# Patient Record
Sex: FEMALE | Race: BLACK OR AFRICAN AMERICAN | NOT HISPANIC OR LATINO | Employment: OTHER | URBAN - METROPOLITAN AREA
[De-identification: names, ages, dates, MRNs, and addresses within clinical notes are randomized per-mention and may not be internally consistent; named-entity substitution may affect disease eponyms.]

---

## 2022-08-17 ENCOUNTER — OFFICE VISIT (OUTPATIENT)
Dept: FAMILY MEDICINE | Facility: CLINIC | Age: 77
End: 2022-08-17
Payer: COMMERCIAL

## 2022-08-17 VITALS
TEMPERATURE: 97.7 F | HEART RATE: 67 BPM | RESPIRATION RATE: 15 BRPM | DIASTOLIC BLOOD PRESSURE: 70 MMHG | OXYGEN SATURATION: 98 % | BODY MASS INDEX: 25.62 KG/M2 | SYSTOLIC BLOOD PRESSURE: 110 MMHG | WEIGHT: 171 LBS

## 2022-08-17 DIAGNOSIS — M85.80 OSTEOPENIA, UNSPECIFIED LOCATION: ICD-10-CM

## 2022-08-17 DIAGNOSIS — E78.5 HYPERLIPIDEMIA LDL GOAL <100: ICD-10-CM

## 2022-08-17 DIAGNOSIS — Z00.00 ANNUAL PHYSICAL EXAM: Primary | ICD-10-CM

## 2022-08-17 RX ORDER — DORZOLAMIDE HCL 20 MG/ML
1 SOLUTION/ DROPS OPHTHALMIC
COMMUNITY
Start: 2022-01-05

## 2022-08-17 RX ORDER — HYDROCHLOROTHIAZIDE 25 MG/1
25 TABLET ORAL
COMMUNITY
Start: 2022-04-29 | End: 2024-04-05

## 2022-08-17 RX ORDER — LOSARTAN POTASSIUM 25 MG/1
1 TABLET ORAL DAILY
COMMUNITY
Start: 2021-01-05 | End: 2023-09-18

## 2022-08-17 RX ORDER — LETROZOLE 2.5 MG/1
1 TABLET, FILM COATED ORAL DAILY
COMMUNITY
Start: 2022-06-22 | End: 2024-07-24

## 2022-08-17 RX ORDER — ALBUTEROL SULFATE 90 UG/1
1-2 AEROSOL, METERED RESPIRATORY (INHALATION)
COMMUNITY
Start: 2022-06-22

## 2022-08-17 RX ORDER — LATANOPROST 50 UG/ML
SOLUTION/ DROPS OPHTHALMIC
COMMUNITY
Start: 2022-07-31

## 2022-08-17 RX ORDER — EVOLOCUMAB 140 MG/ML
140 INJECTION, SOLUTION SUBCUTANEOUS
COMMUNITY
Start: 2020-12-30

## 2022-08-17 NOTE — PATIENT INSTRUCTIONS
ASSESSMENT/PLAN:    Annual Exam/Preventive Issues   -Will check Fasting labs. Schedule visit  -  - Up to date on Colon cancer screenings  - Has had an Dexa scan and was slightly low. Needs a repeat. Ordered  - Suggested going to pharmacy for Shingles vaccines.   - Has eye care.     -Specific concerns:     1. Breast cancer is being followed by Oncology  2. Hypertension, under good control, yet with some elevated pressures in afternoon. But, some lightheadedness.   - Start Magnesium 325 mg at bedtime  - Also, eat a diet high in garlic and green vegetables    3. For balance, look into Jay Chi online, try [employer]  Let me know if questions.     4. For RIGHT ear cerumen, use Debrox     -Follow up: as needed or in 1 year    Gorge Odom MD, MS

## 2022-08-17 NOTE — NURSING NOTE
77 year old  Chief Complaint   Patient presents with     [facility], [place], live 1/2 year in [place],      Hypertension     BP been higher in the evenings (146/86), getting headaches       Blood pressure 110/70, pulse 67, temperature 97.7  F (36.5  C), temperature source Skin, resp. rate 15, weight 77.6 kg (171 lb), SpO2 98 %. Body mass index is 25.62 kg/m .  There is no problem list on file for this patient.      Wt Readings from Last 2 Encounters:   08/17/22 77.6 kg (171 lb)   01/06/16 70.3 kg (155 lb)     BP Readings from Last 3 Encounters:   08/17/22 110/70   01/06/16 122/80   12/30/15 162/80         Current Outpatient Medications   Medication     albuterol (PROAIR HFA/PROVENTIL HFA/VENTOLIN HFA) 108 (90 Base) MCG/ACT inhaler     ASPIRIN ADULT LOW STRENGTH PO     dorzolamide (TRUSOPT) 2 % ophthalmic solution     evolocumab (REPATHA SURECLICK) 140 MG/ML prefilled autoinjector     hydrochlorothiazide (HYDRODIURIL) 25 MG tablet     latanoprost (XALATAN) 0.005 % ophthalmic solution     letrozole (FEMARA) 2.5 MG tablet     losartan (COZAAR) 25 MG tablet     VITAMIN D, CHOLECALCIFEROL, PO     ANASTROZOLE PO     ATORVASTATIN CALCIUM PO     LISINOPRIL PO     No current facility-administered medications for this visit.       Social History     Tobacco Use     Smoking status: Never Smoker     Smokeless tobacco: Never Used   Substance Use Topics     Alcohol use: Yes     Drug use: No       Health Maintenance Due   Topic Date Due     DEXA  Never done     ADVANCE CARE PLANNING  Never done     MAMMO SCREENING  Never done     COLORECTAL CANCER SCREENING  Never done     HEPATITIS C SCREENING  Never done     LIPID  Never done     FALL RISK ASSESSMENT  Never done     ZOSTER IMMUNIZATION (2 of 3) 09/24/2012     COVID-19 Vaccine (2 - Pfizer series) 02/03/2021       No results found for: PAP      August 17, 2022 10:56 AM

## 2022-08-17 NOTE — PROGRESS NOTES
Alessandra Nieves is a 77 year old female who presents today to the [facility] to establish care and have an annual exam.         ASSESSMENT/PLAN:    Annual Exam/Preventive Issues   -Will check Fasting labs. Schedule visit  -  - Up to date on Colon cancer screenings  - Has had an Dexa scan and was slightly low. Needs a repeat. Ordered  - Suggested going to pharmacy for Shingles vaccines.   - Has eye care.     -Specific concerns:     1. Breast cancer is being followed by Oncology  2. Hypertension, under good control, yet with some elevated pressures in afternoon. But, some lightheadedness.   - Start Magnesium 325 mg at bedtime  - Also, eat a diet high in garlic and green vegetables    3. For balance, look into Jay Chi online, try [employer]  Let me know if questions.     4. For RIGHT ear cerumen, use Debrox     -Follow up: as needed or in 1 year    Gorge Odom MD, MS    Introduction: Alessandra is the wife of Terry López who will see in clinic.  She is here making her first visit to our clinic.  She would like to establish care here.  Formally, she has had much of her care down in [facility] where they spend some of the winter.      No major concerns today.  Had breast cancer about 10 years ago. Had surgery and radiation. Now on Letrozole. Had tried other meds but developed aches.     Has high cholesterol. Was intolerant of Rosuvastatin. Now on Repatha and she has no problems with it.     Has hypertension. BP in evenings is about 140s/80s. She's worried about that. She tends to be much lower in the morning. Takes hydrochlorothiazide in morning and Losartan in the evenings.   She wonders whether changes are needed. Has been too low in past and has had syncopal episodes in the past. She's had evaluations from Cardiology    Has a strong family history of hypertension and hypercholesterolemia.   Spends half the year in [place]    Current Medications include:   Current Outpatient Medications   Medication Sig Dispense  Refill     albuterol (PROAIR HFA/PROVENTIL HFA/VENTOLIN HFA) 108 (90 Base) MCG/ACT inhaler Inhale 1-2 puffs into the lungs       ASPIRIN ADULT LOW STRENGTH PO Take 81 mg by mouth daily       dorzolamide (TRUSOPT) 2 % ophthalmic solution Apply 1 drop to eye       evolocumab (REPATHA SURECLICK) 140 MG/ML prefilled autoinjector Inject 140 mg Subcutaneous       hydrochlorothiazide (HYDRODIURIL) 25 MG tablet Take 25 mg by mouth       latanoprost (XALATAN) 0.005 % ophthalmic solution        letrozole (FEMARA) 2.5 MG tablet Take 1 tablet by mouth daily       losartan (COZAAR) 25 MG tablet Take 1 tablet by mouth daily       VITAMIN D, CHOLECALCIFEROL, PO Take 2,000 Units by mouth daily       Allergies   Allergen Reactions     Penicillins        Social  Social History     Social History Narrative     to Terry López. They have 7 children.     Originally from [place], [place].     Has lived in [place] for decades.     Was H.R. 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     Spends half the year in [place].      One of her daughters just won an International Architecture prize in [place]    Lifestyle habits and Preventive health issues:     Physical activity - Walks a lot, a few miles/day. Also, with E-biSolexant    Diet - Healthy    Cancer screenings:  Breast: - See above. Under care of breast cancer specialist.   Cervical: - Had many normal. Not needed  Colorectal: - Last colonoscopy was this year and normal.       Advance directive: Has at home  Hearing concerns: None.   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 On drops.   Independent at home:  Yes.    Safe : No concerns.     Has not had falls at home or trouble with balance.   Memory concerns: None.   Recall of events and words, intact.            ROS  PHQ-2 Score:     PHQ-2 ( 1999 Pfizer) 8/17/2022   Q1: Little interest or pleasure in doing things 0   Q2: Feeling down, depressed or hopeless 0   PHQ-2 Score 0       CONSTITUTIONAL:NEGATIVE for fever, chills, change in  weight  INTEGUMENTARY/SKIN: NEGATIVE for worrisome rashes, moles or lesions  EYES: NEGATIVE for vision changes or irritation  ENT/MOUTH: NEGATIVE for ear, mouth and throat problems  RESP:NEGATIVE for significant cough or SOB  CV: NEGATIVE for chest pain, palpitations, ZACARIAS, orthopnea, PND  or peripheral edema  GI: NEGATIVE for nausea, abdominal pain, heartburn, or change in bowel habits  :NEGATIVE for frequency, dysuria, or hematuria  MUSCULOSKELETAL:NEGATIVE for significant arthralgias or myalgia  NEURO: NEGATIVE for weakness, dizziness or paresthesias  ENDOCRINE: NEGATIVE for polyuria/dipsia,  temperature intolerance, skin/hair changes  HEME/ALLERGY/IMMUNE: NEGATIVE for bleeding problems  PSYCHIATRIC: NEGATIVE for changes in mood or affect      Health Maintenance   Topic Date Due     DEXA  Never done     ADVANCE CARE PLANNING  Never done     MAMMO SCREENING  Never done     COLORECTAL CANCER SCREENING  Never done     HEPATITIS C SCREENING  Never done     LIPID  Never done     FALL RISK ASSESSMENT  Never done     ZOSTER IMMUNIZATION (2 of 3) 09/24/2012     COVID-19 Vaccine (2 - Pfizer series) 02/03/2021     INFLUENZA VACCINE (1) 09/01/2022     DTAP/TDAP/TD IMMUNIZATION (2 - Td or Tdap) 11/12/2023     PHQ-2 (once per calendar year)  Completed     Pneumococcal Vaccine: 65+ Years  Completed     IPV IMMUNIZATION  Aged Out     MENINGITIS IMMUNIZATION  Aged Out     HEPATITIS B IMMUNIZATION  Aged Out         There is no problem list on file for this patient.      Past Surgical History:   Procedure Laterality Date     BIOPSY      breast     BREAST SURGERY      right lumpectomy     COLONOSCOPY       PHACOEMULSIFICATION CLEAR CORNEA WITH DELUXE INTRAOCULAR LENS IMPLANT Left 12/30/2015    Procedure: PHACOEMULSIFICATION CLEAR CORNEA WITH DELUXE INTRAOCULAR LENS IMPLANT;  Surgeon: Wallace Tim MD;  Location[facility]     PHACOEMULSIFICATION CLEAR CORNEA WITH DELUXE INTRAOCULAR LENS IMPLANT Right 1/6/2016    Procedure:  PHACOEMULSIFICATION CLEAR CORNEA WITH DELUXE INTRAOCULAR LENS IMPLANT;  Surgeon: Wallace Tim MD;  Location: [facility]     refractive surgery       TONSILLECTOMY & ADENOIDECTOMY       TUBAL LIGATION         No family history on file.          Immunizations are as follows:      Immunization History   Administered Date(s) Administered     COVID-19,PF,Pfizer (12+ Yrs) 01/13/2021     Influenza (IIV3) PF 09/29/2020     Influenza Vaccine IM > 6 months Valent IIV4 (Alfuria,Fluzone) 11/19/2014, 12/08/2015, 12/12/2016     Pneumo Conj 13-V (2010&after) 11/19/2014     Pneumococcal 23 valent 08/31/2012     Td (Adult), Adsorbed 01/01/2001, 01/01/2004     Tdap (Adacel,Boostrix) 11/12/2013     Zoster vaccine, live 07/30/2012           EXAM  /70 (BP Location: Left arm, Patient Position: Sitting, Cuff Size: Adult Regular)   Pulse 67   Temp 97.7  F (36.5  C) (Skin)   Resp 15   Wt 77.6 kg (171 lb)   SpO2 98%   BMI 25.62 kg/m        GENERAL APPEARANCE: Appears well.  HEENT: RIGHT Tm with some cerumen. LEFT is normal. Neck is supple. No adenopathy, thyroid normal to palpation  RESP: Lungs clear to auscultation bilaterally.  Axillae: no palpable axillary masses or adenopathy  CV: regular rate and rhythm, normal S1 S2, no murmur, no carotid bruits  ABDOMEN: soft, nontender, without HSM or masses. Bowel sounds normal   and Rectal exam: deferred  MS: Extremities normal- no gross deformities noted, no tender, hot or swollen joints.    SKIN: no suspicious lesions or rashes  NEURO: Normal strength and tone, sensory exam grossly normal  PSYCH: mentation appears normal. and affect normal/bright.  EXT: no peripheral edema        SEE TOP OF NOTE FOR ASSESSMENT AND PLAN      --Gorge Odom MD  [facility]  Department of Family Medicine and Pending sale to [facility]

## 2022-08-19 ENCOUNTER — LAB (OUTPATIENT)
Dept: LAB | Facility: CLINIC | Age: 77
End: 2022-08-19
Payer: COMMERCIAL

## 2022-08-19 DIAGNOSIS — Z00.00 ANNUAL PHYSICAL EXAM: ICD-10-CM

## 2022-08-19 DIAGNOSIS — E78.5 HYPERLIPIDEMIA LDL GOAL <100: ICD-10-CM

## 2022-08-19 LAB
ANION GAP SERPL CALCULATED.3IONS-SCNC: 11 MMOL/L (ref 7–15)
BUN SERPL-MCNC: 10.9 MG/DL (ref 8–23)
CALCIUM SERPL-MCNC: 10.1 MG/DL (ref 8.8–10.2)
CHLORIDE SERPL-SCNC: 103 MMOL/L (ref 98–107)
CHOLEST SERPL-MCNC: 189 MG/DL
CREAT SERPL-MCNC: 0.87 MG/DL (ref 0.51–0.95)
DEPRECATED HCO3 PLAS-SCNC: 23 MMOL/L (ref 22–29)
GFR SERPL CREATININE-BSD FRML MDRD: 69 ML/MIN/1.73M2
GLUCOSE SERPL-MCNC: 87 MG/DL (ref 70–99)
HDLC SERPL-MCNC: 78 MG/DL
LDLC SERPL CALC-MCNC: 98 MG/DL
NONHDLC SERPL-MCNC: 111 MG/DL
POTASSIUM SERPL-SCNC: 3.9 MMOL/L (ref 3.4–5.3)
SODIUM SERPL-SCNC: 137 MMOL/L (ref 136–145)
TRIGL SERPL-MCNC: 67 MG/DL

## 2022-08-19 PROCEDURE — 80048 BASIC METABOLIC PNL TOTAL CA: CPT | Performed by: FAMILY MEDICINE

## 2022-08-19 PROCEDURE — 80061 LIPID PANEL: CPT | Performed by: FAMILY MEDICINE

## 2022-11-21 ENCOUNTER — OFFICE VISIT (OUTPATIENT)
Dept: FAMILY MEDICINE | Facility: CLINIC | Age: 77
End: 2022-11-21
Payer: COMMERCIAL

## 2022-11-21 ENCOUNTER — TELEPHONE (OUTPATIENT)
Dept: FAMILY MEDICINE | Facility: CLINIC | Age: 77
End: 2022-11-21

## 2022-11-21 VITALS
BODY MASS INDEX: 25.62 KG/M2 | DIASTOLIC BLOOD PRESSURE: 75 MMHG | RESPIRATION RATE: 14 BRPM | OXYGEN SATURATION: 98 % | TEMPERATURE: 97.1 F | SYSTOLIC BLOOD PRESSURE: 116 MMHG | WEIGHT: 173 LBS | HEIGHT: 69 IN | HEART RATE: 74 BPM

## 2022-11-21 DIAGNOSIS — R12 HEARTBURN: ICD-10-CM

## 2022-11-21 DIAGNOSIS — K21.9 GASTROESOPHAGEAL REFLUX DISEASE, UNSPECIFIED WHETHER ESOPHAGITIS PRESENT: ICD-10-CM

## 2022-11-21 DIAGNOSIS — K21.9 GASTROESOPHAGEAL REFLUX DISEASE, UNSPECIFIED WHETHER ESOPHAGITIS PRESENT: Primary | ICD-10-CM

## 2022-11-21 DIAGNOSIS — M25.512 ACUTE PAIN OF LEFT SHOULDER: ICD-10-CM

## 2022-11-21 DIAGNOSIS — R94.31 ABNORMAL ELECTROCARDIOGRAM: ICD-10-CM

## 2022-11-21 DIAGNOSIS — R22.1 THROAT SWELLING: ICD-10-CM

## 2022-11-21 NOTE — NURSING NOTE
"77 year old  Chief Complaint   Patient presents with     Throat Problem     Patient is wheezing, experiencing lump in throat, painful. Ongoing 2 weeks, coming and going.        Blood pressure 116/75, pulse 74, temperature 97.1  F (36.2  C), temperature source Skin, resp. rate 14, height 1.755 m (5' 9.09\"), weight 78.5 kg (173 lb), SpO2 98 %. Body mass index is 25.48 kg/m .  There is no problem list on file for this patient.      Wt Readings from Last 2 Encounters:   11/21/22 78.5 kg (173 lb)   08/17/22 77.6 kg (171 lb)     BP Readings from Last 3 Encounters:   11/21/22 116/75   08/17/22 110/70   01/06/16 122/80         Current Outpatient Medications   Medication     ASPIRIN ADULT LOW STRENGTH PO     dorzolamide (TRUSOPT) 2 % ophthalmic solution     evolocumab (REPATHA SURECLICK) 140 MG/ML prefilled autoinjector     hydrochlorothiazide (HYDRODIURIL) 25 MG tablet     latanoprost (XALATAN) 0.005 % ophthalmic solution     letrozole (FEMARA) 2.5 MG tablet     losartan (COZAAR) 25 MG tablet     VITAMIN D, CHOLECALCIFEROL, PO     albuterol (PROAIR HFA/PROVENTIL HFA/VENTOLIN HFA) 108 (90 Base) MCG/ACT inhaler     No current facility-administered medications for this visit.       Social History     Tobacco Use     Smoking status: Never     Smokeless tobacco: Never   Substance Use Topics     Alcohol use: Yes     Drug use: No       Health Maintenance Due   Topic Date Due     DEXA  Never done     ADVANCE CARE PLANNING  Never done     MAMMO SCREENING  Never done     COLORECTAL CANCER SCREENING  Never done     HEPATITIS C SCREENING  Never done     FALL RISK ASSESSMENT  Never done     ZOSTER IMMUNIZATION (2 of 3) 09/24/2012       No results found for: PAP      November 21, 2022 9:37 AM    "

## 2022-11-21 NOTE — PATIENT INSTRUCTIONS
"ASSESSMENT/PLAN:    Joaquín is a 76 yo with symptoms of fullness in her throat, what feels like \"indigestion\" and also with LEFT shoulder aching.   The LEFT shoulder is most likely rotator cuff degeration  Will treat with Prilosec  Also, obtain Ultrasound of neck   Refer to ENT. May cancel referral is symptoms clear  Obtain ECG in clinic. Of note, had normal stress Echo about 7 months ago    Note: EKG revealed NRS at 65 beats/minute, but with negative T waves suggestive of some possible anterior ischemia. Importantly, Alessandra informs me that she has had negative T waves in the past. I can't find those records.   - Will refer to Cardiology for further evaluation,     Follow-up - Next week by video or in person visit.     --Gorge Odom MD  "

## 2022-11-21 NOTE — PROGRESS NOTES
"Medical assistant intake:  Alessandra Nieves is a 77 year old female who presents to [facility] today for Throat Problem (Patient is wheezing, experiencing lump in throat, painful. Ongoing 2 weeks, coming and going. )        ASSESSMENT/PLAN:    Joaquín is a 78 yo with symptoms of fullness in her throat, what feels like \"indigestion\" and also with LEFT shoulder aching.   The LEFT shoulder is most likely rotator cuff degeration  1. Will treat with Prilosec  2. Also, obtain Ultrasound of neck   3. Refer to ENT. May cancel referral is symptoms clear  4. Obtain ECG in clinic. Of note, had normal stress Echo about 7 months ago    Note: EKG revealed NRS at 65 beats/minute, but with negative T waves suggestive of some possible anterior ischemia. Importantly, Alessandra informs me that she has had negative T waves in the past. I can't find those records.     - Will refer to Cardiology for further evaluation,     Also with left shoulder pain that appears consistent with rotator cuff tendinopathy.  Will reevaluate if symptoms persist.    Follow-up - Next week by video or in person visit.     --Gorge Odom MD      SUBJECTIVE:   Joaquín developed a lump in her throat and a feeling of indigestion about 2 weeks ago. The lump in the throat has come and gone. Currently not bothering her but still feels a fullness in the throat. No pain with swallowing.  Has been wheezing some in the cold. No shortness of breath with walking.     Review Of Systems:  Has had a new aching in her LEFT arm. The ache does not seem to her to be related temporally to the lump in the throat.   No fevers or sweats or chills.   Normal bowels and bladder.     Has otherwise been in usual state of health, e.g.   Cardiovascular: negative  Respiratory: No shortness of breath, dyspnea on exertion, cough, or hemoptysis  Genitourinary: negative    Problem list per EMR:  There is no problem list on file for this patient.      Current Outpatient Medications " "  Medication Sig Dispense Refill     ASPIRIN ADULT LOW STRENGTH PO Take 81 mg by mouth daily       dorzolamide (TRUSOPT) 2 % ophthalmic solution Apply 1 drop to eye       evolocumab (REPATHA SURECLICK) 140 MG/ML prefilled autoinjector Inject 140 mg Subcutaneous       hydrochlorothiazide (HYDRODIURIL) 25 MG tablet Take 25 mg by mouth       latanoprost (XALATAN) 0.005 % ophthalmic solution        letrozole (FEMARA) 2.5 MG tablet Take 1 tablet by mouth daily       losartan (COZAAR) 25 MG tablet Take 1 tablet by mouth daily       VITAMIN D, CHOLECALCIFEROL, PO Take 2,000 Units by mouth daily       albuterol (PROAIR HFA/PROVENTIL HFA/VENTOLIN HFA) 108 (90 Base) MCG/ACT inhaler Inhale 1-2 puffs into the lungs (Patient not taking: Reported on 11/21/2022)         Allergies   Allergen Reactions     Penicillins Shortness Of Breath        Social:   Unchanged.  Plans to be moving to [place] for the wintertime in a few weeks from now.      OBJECTIVE    Vitals: /75 (BP Location: Left arm, Patient Position: Sitting, Cuff Size: Adult Regular)   Pulse 74   Temp 97.1  F (36.2  C) (Skin)   Resp 14   Ht 1.755 m (5' 9.09\")   Wt 78.5 kg (173 lb)   SpO2 98%   BMI 25.48 kg/m    BMI= Body mass index is 25.48 kg/m .  She appears in her usual state of good health.  The neck is supple and without any thyromegaly or nodules noted.  Oropharynx is clear.    Cardiovascular-regular rate and rhythm without murmur    Lungs-clear to auscultation throughout    Extremities-no edema in the lower extremities    Left shoulder is with some aching in the anterior glenohumeral joint.  Her range of motion is intact with abduction to approximately 170 degrees internal rotation to approximately T10 and external rotation to approximately 85 degrees.  Strength is slightly limited on the left compared to the right even though she is left-handed.  It is at about 4/5 with the supraspinatus testing on the left shoulder.  Infraspinatus testing at 5-/5.  " Subscapularis appears intact.    EKG with normal sinus rhythm at 75 bpm with inverted T waves throughout the anterior precordial leads.     SEE TOP OF NOTE FOR ASSESSMENT AND PLAN  38 minutes spent on the date of the encounter doing chart review, history and exam, documentation and further activities as noted in the note.     --Gorge Odom MD  [facility], Department of Family Medicine and Community Health

## 2022-11-21 NOTE — TELEPHONE ENCOUNTER
Resending omeprazole order to pt pharmacy as they claim it was not received.    Rasta Barajas - ZACKERY, RN  11/21/22 4:35 PM

## 2022-11-22 ENCOUNTER — OFFICE VISIT (OUTPATIENT)
Dept: CARDIOLOGY | Facility: CLINIC | Age: 77
End: 2022-11-22
Attending: INTERNAL MEDICINE
Payer: COMMERCIAL

## 2022-11-22 VITALS
HEART RATE: 70 BPM | DIASTOLIC BLOOD PRESSURE: 88 MMHG | HEIGHT: 68 IN | OXYGEN SATURATION: 99 % | WEIGHT: 171.4 LBS | SYSTOLIC BLOOD PRESSURE: 148 MMHG | BODY MASS INDEX: 25.98 KG/M2

## 2022-11-22 DIAGNOSIS — R07.89 ATYPICAL CHEST PAIN: Primary | ICD-10-CM

## 2022-11-22 DIAGNOSIS — R94.31 ABNORMAL ELECTROCARDIOGRAM: ICD-10-CM

## 2022-11-22 PROCEDURE — 99205 OFFICE O/P NEW HI 60 MIN: CPT | Performed by: INTERNAL MEDICINE

## 2022-11-22 PROCEDURE — G0463 HOSPITAL OUTPT CLINIC VISIT: HCPCS

## 2022-11-22 ASSESSMENT — PAIN SCALES - GENERAL: PAINLEVEL: NO PAIN (0)

## 2022-11-22 NOTE — LETTER
11/22/2022      RE: Alessandra Nieves  [id number]  [facility] [address]       Dear Colleague,    Thank you for the opportunity to participate in the care of your patient, Alessandra Nieves, at the [facility] [place] at [facility]. Please see a copy of my visit note below.    Chief complaint: abnormal ECG    HPI:   Alessandra Nieves is a 77 year old female with history of breast cancer, PE after lumpectomy, HTN, HL on Repatha, statin intolerance who presents for evaluation of chest pain and abnormal ECG.    She reports sensation of fullness and discomfort in her bilateral upper chest/neck for the past 2 weeks. She also has globus sensation. She had an episode last PM which continued all night (hours) until she went to sleep. She woke up with mild residual symptoms. She has never had symptoms like this in the past.    She had several episodes of near-syncope in the remote past but no syncope and has had evaluation for these which was unrevealing.    She has had a gradual decrease in exertional capacity. She did previously walk 3+ miles/day prior to COVID. She was riding her bike this summer without difficulty. She walks every 2-3 days for a mile and does have to stop due to dyspnea. No chest pain with activity.    She saw her PCP, Dr. Odom, who prescribed PPI and ordered ECG which was abnormal prompting referral.    Family history is notable for mother with HF and DM. Multiple family members with DM and CVA, no other cardiac history.    She denies any chest pain, dyspnea at rest or with exertion, PND, orthopnea, peripheral edema, palpitations, lightheadedness or syncope.       PAST MEDICAL HISTORY:  Past Medical History:   Diagnosis Date     Anxiety     with depression     Anxiety with obsessional features      Cervical spondylosis      Hx pulmonary embolism      Hyperlipidemia      Hypertension      Infiltrating ductal carcinoma of  "bilateral female breasts (H)      Irregular heart beat      Left arm weakness      Neck pain      Neutropenia (H)      Osteopenia      Pulmonary emboli (H)      Shortness of breath      Syncope      Thyroid nodule        CURRENT MEDICATIONS:  Current Outpatient Medications   Medication Sig Dispense Refill     albuterol (PROAIR HFA/PROVENTIL HFA/VENTOLIN HFA) 108 (90 Base) MCG/ACT inhaler Inhale 1-2 puffs into the lungs       ASPIRIN ADULT LOW STRENGTH PO Take 81 mg by mouth daily       dorzolamide (TRUSOPT) 2 % ophthalmic solution Apply 1 drop to eye       evolocumab (REPATHA SURECLICK) 140 MG/ML prefilled autoinjector Inject 140 mg Subcutaneous       hydrochlorothiazide (HYDRODIURIL) 25 MG tablet Take 25 mg by mouth       latanoprost (XALATAN) 0.005 % ophthalmic solution        letrozole (FEMARA) 2.5 MG tablet Take 1 tablet by mouth daily       losartan (COZAAR) 25 MG tablet Take 1 tablet by mouth daily       omeprazole (PRILOSEC) 20 MG DR capsule Use 1 cap by mouth daily. 30 capsule 0     VITAMIN D, CHOLECALCIFEROL, PO Take 2,000 Units by mouth daily         ALLERGIES:     Allergies   Allergen Reactions     Penicillins Shortness Of Breath       FAMILY HISTORY:  Family history is notable for mother with HF and DM. Multiple family members with DM and CVA, no other cardiac history.  No family history of premature CAD or sudden death.      SOCIAL HISTORY:  Social History     Tobacco Use     Smoking status: Never     Smokeless tobacco: Never   Substance Use Topics     Alcohol use: Yes     Drug use: No       ROS:   A comprehensive 14 point review of systems is negative other than as mentioned in HPI.    Exam:  BP (!) 148/88 (BP Location: Left arm, Patient Position: Sitting, Cuff Size: Adult Regular)   Pulse 70   Ht 1.723 m (5' 7.84\")   Wt 77.7 kg (171 lb 6.4 oz)   SpO2 99%   BMI 26.19 kg/m    GENERAL APPEARANCE: healthy, alert and no distress  EYES: no icterus, no xanthelasmas  ENT: normal palate, mucosa moist, no " central cyanosis  NECK: JVP not elevated  RESPIRATORY: lungs clear to auscultation - no rales, rhonchi or wheezes, no use of accessory muscles, no retractions, respirations are unlabored, normal respiratory rate  CARDIOVASCULAR: regular rhythm, normal S1 with physiologic split S2, no S3 or S4 and no murmur, click or rub.  GI: soft, non tender, bowel sounds normal,no abdominal bruits  EXTREMITIES: no edema, no bruits  NEURO: alert and oriented to person/place/time, normal speech, gait and affect  VASC: Radial, dorsalis pedis and posterior tibialis pulses 2+ bilaterally.  SKIN: no ecchymoses, no rashes.  PSYCH: cooperative, affect appropriate.     Labs:  Reviewed.       Testing/Procedures:  I personally visualized and interpreted:  ECG 11/21/22: sinus rhythm, VR 65,  QRS 90 QTc 418. Anterolateral ST depressions and TWI.     Outside results of note:  Outside records from [facility] ([place]), [facility] were obtained, and relevant results/notes have been incorporated into HPI.    ECG ([place], [place]): Normal sinus rhythm   Possible Left atrial enlargement   Septal infarct , age undetermined   T wave abnormality, consider anterior ischemia    Stress echcoardiogram 4/20/22 ([place], [place]):  1. The left ventricular systolic function is normal with an LVEF of 62 %. The LVEF was estimated   using the biplane method of discs (Paredes's).   2. Normal diastolic function and LV end-diastolic pressure.   3. Trace-to-mild mitral regurgitation.   4. Mild tricuspid regurgitation (1+).   5. Her level of exercise represents a good exercise tolerance for age.   6. Normal heart rate response to stress. The blood pressure response to stress was hypertensive.   7. There is no echocardiographic evidence of ischemia.        TTE 2020 [facility]  1. Global systolic function: The left ventricular systolic function is normal with an LVEF of 57 %.   (+) Concentric LV remodeling.   2. Diastolic function: The LV end-diastolic  pressure is normal.   3. Suboptimal tricuspid regurgitant Doppler wave profile precludes estimation of the RVSP.   4. There is no echocardiographic evidence of valvular heart disease.       Assessment and Plan:   1. Atypical chest pain  2. Abnormal ECG (ST depressions/T wave inversions)  Given baseline abnormal ECG with apparently new ischemic changes on 11/21/22 and negative stress test 4/20/22 with onset of symptoms in the past year and significant risk factors (HTN, HL), I do believe that further workup is warranted and that the most appropriate evaluation is with an anatomic modality. Plan for coronary CTA. Will also obtain TTE to exclude structural abnormality (LVH, undiagnosed apical HCM, etc.) as etiology for symptoms  -coronary CTA next available  -TTE next available  -follow up with EMILIE after the above    The patient states understanding and is agreeable with plan.   Chart review time today: 27 minutes  Visit time today: 39 minutes  Total time spent today: 66 minutes    Gurmeet Martinez MD  Cardiology    CC  [facility] PALOMO

## 2022-11-22 NOTE — PROGRESS NOTES
Chief complaint: abnormal ECG    HPI:   Alessandra Nieves is a 77 year old female with history of breast cancer, PE after lumpectomy, HTN, HL on Repatha, statin intolerance who presents for evaluation of chest pain and abnormal ECG.    She reports sensation of fullness and discomfort in her bilateral upper chest/neck for the past 2 weeks. She also has globus sensation. She had an episode last PM which continued all night (hours) until she went to sleep. She woke up with mild residual symptoms. She has never had symptoms like this in the past.    She had several episodes of near-syncope in the remote past but no syncope and has had evaluation for these which was unrevealing.    She has had a gradual decrease in exertional capacity. She did previously walk 3+ miles/day prior to COVID. She was riding her bike this summer without difficulty. She walks every 2-3 days for a mile and does have to stop due to dyspnea. No chest pain with activity.    She saw her PCP, Dr. Odom, who prescribed PPI and ordered ECG which was abnormal prompting referral.    Family history is notable for mother with HF and DM. Multiple family members with DM and CVA, no other cardiac history.    She denies any chest pain, dyspnea at rest or with exertion, PND, orthopnea, peripheral edema, palpitations, lightheadedness or syncope.       PAST MEDICAL HISTORY:  Past Medical History:   Diagnosis Date     Anxiety     with depression     Anxiety with obsessional features      Cervical spondylosis      Hx pulmonary embolism      Hyperlipidemia      Hypertension      Infiltrating ductal carcinoma of bilateral female breasts (H)      Irregular heart beat      Left arm weakness      Neck pain      Neutropenia (H)      Osteopenia      Pulmonary emboli (H)      Shortness of breath      Syncope      Thyroid nodule        CURRENT MEDICATIONS:  Current Outpatient Medications   Medication Sig Dispense Refill     albuterol (PROAIR HFA/PROVENTIL HFA/VENTOLIN HFA)  "108 (90 Base) MCG/ACT inhaler Inhale 1-2 puffs into the lungs       ASPIRIN ADULT LOW STRENGTH PO Take 81 mg by mouth daily       dorzolamide (TRUSOPT) 2 % ophthalmic solution Apply 1 drop to eye       evolocumab (REPATHA SURECLICK) 140 MG/ML prefilled autoinjector Inject 140 mg Subcutaneous       hydrochlorothiazide (HYDRODIURIL) 25 MG tablet Take 25 mg by mouth       latanoprost (XALATAN) 0.005 % ophthalmic solution        letrozole (FEMARA) 2.5 MG tablet Take 1 tablet by mouth daily       losartan (COZAAR) 25 MG tablet Take 1 tablet by mouth daily       omeprazole (PRILOSEC) 20 MG DR capsule Use 1 cap by mouth daily. 30 capsule 0     VITAMIN D, CHOLECALCIFEROL, PO Take 2,000 Units by mouth daily         ALLERGIES:     Allergies   Allergen Reactions     Penicillins Shortness Of Breath       FAMILY HISTORY:  Family history is notable for mother with HF and DM. Multiple family members with DM and CVA, no other cardiac history.  No family history of premature CAD or sudden death.      SOCIAL HISTORY:  Social History     Tobacco Use     Smoking status: Never     Smokeless tobacco: Never   Substance Use Topics     Alcohol use: Yes     Drug use: No       ROS:   A comprehensive 14 point review of systems is negative other than as mentioned in HPI.    Exam:  BP (!) 148/88 (BP Location: Left arm, Patient Position: Sitting, Cuff Size: Adult Regular)   Pulse 70   Ht 1.723 m (5' 7.84\")   Wt 77.7 kg (171 lb 6.4 oz)   SpO2 99%   BMI 26.19 kg/m    GENERAL APPEARANCE: healthy, alert and no distress  EYES: no icterus, no xanthelasmas  ENT: normal palate, mucosa moist, no central cyanosis  NECK: JVP not elevated  RESPIRATORY: lungs clear to auscultation - no rales, rhonchi or wheezes, no use of accessory muscles, no retractions, respirations are unlabored, normal respiratory rate  CARDIOVASCULAR: regular rhythm, normal S1 with physiologic split S2, no S3 or S4 and no murmur, click or rub.  GI: soft, non tender, bowel sounds " normal,no abdominal bruits  EXTREMITIES: no edema, no bruits  NEURO: alert and oriented to person/place/time, normal speech, gait and affect  VASC: Radial, dorsalis pedis and posterior tibialis pulses 2+ bilaterally.  SKIN: no ecchymoses, no rashes.  PSYCH: cooperative, affect appropriate.     Labs:  Reviewed.       Testing/Procedures:  I personally visualized and interpreted:  ECG 11/21/22: sinus rhythm, VR 65,  QRS 90 QTc 418. Anterolateral ST depressions and TWI.     Outside results of note:  Outside records from [facility] ([place]), [facility] were obtained, and relevant results/notes have been incorporated into HPI.    ECG ([place], [place]): Normal sinus rhythm   Possible Left atrial enlargement   Septal infarct , age undetermined   T wave abnormality, consider anterior ischemia    Stress echcoardiogram 4/20/22 ([place], [place]):  1. The left ventricular systolic function is normal with an LVEF of 62 %. The LVEF was estimated   using the biplane method of discs (Paredes's).   2. Normal diastolic function and LV end-diastolic pressure.   3. Trace-to-mild mitral regurgitation.   4. Mild tricuspid regurgitation (1+).   5. Her level of exercise represents a good exercise tolerance for age.   6. Normal heart rate response to stress. The blood pressure response to stress was hypertensive.   7. There is no echocardiographic evidence of ischemia.        TTE 2020 [facility]  1. Global systolic function: The left ventricular systolic function is normal with an LVEF of 57 %.   (+) Concentric LV remodeling.   2. Diastolic function: The LV end-diastolic pressure is normal.   3. Suboptimal tricuspid regurgitant Doppler wave profile precludes estimation of the RVSP.   4. There is no echocardiographic evidence of valvular heart disease.       Assessment and Plan:   1. Atypical chest pain  2. Abnormal ECG (ST depressions/T wave inversions)  Given baseline abnormal ECG with apparently new ischemic changes on 11/21/22  and negative stress test 4/20/22 with onset of symptoms in the past year and significant risk factors (HTN, HL), I do believe that further workup is warranted and that the most appropriate evaluation is with an anatomic modality. Plan for coronary CTA. Will also obtain TTE to exclude structural abnormality (LVH, undiagnosed apical HCM, etc.) as etiology for symptoms  -coronary CTA next available  -TTE next available  -follow up with EMILIE after the above    The patient states understanding and is agreeable with plan.   Chart review time today: 27 minutes  Visit time today: 39 minutes  Total time spent today: 66 minutes    Gurmeet Martinez MD  Cardiology    CC  [facility]

## 2022-11-22 NOTE — NURSING NOTE
Chief Complaint   Patient presents with     New Patient     New cardiology, 76 yo female with abnormal EKG (done 11/21) presents for initial visit.          Vitals were taken and medications reconciled.     Gustabo Virgen, EMT   3:26 PM

## 2022-11-22 NOTE — PATIENT INSTRUCTIONS
"Cardiology Providers you saw during your visit:  Dr. Martinez    Medication changes:  No changes    Follow up:  Need to schedule a CTA and echo, follow up will be based on results    Please call if you have :  1. Weight gain of more than 2 pounds in a day or 5 pounds in a week  2. Increased shortness of breath, swelling or bloating  3. Dizziness, lightheadedness   4. Any questions or concerns.     Follow the American Heart Association Diet and Lifestyle recommendations:  Limit saturated fat, trans fat, sodium, red meat, sweets and sugar-sweetened beverages. If you choose to eat red meat, compare labels and select the leanest cuts available.  Aim for at least 150 minutes of moderate physical activity or 75 minutes of vigorous physical activity - or an equal combination of both - each week.    During business hours: [phone], option # 1 \"To leave a message for your care team\"     After hours, weekends or holidays: On Call Cardiologist [phone]   option #4 and ask to speak to the on-call Cardiologist. Inform them you are a CORE/heart failure patient at the [facility].    Kathryn Robles RN BSN  Cardiology Nurse Coordinator - Heart Failure Clinic  [facility]  [phone] option 1 to schedule an appointment or leave a message for your care team    "

## 2022-11-23 ENCOUNTER — HOSPITAL ENCOUNTER (OUTPATIENT)
Dept: CARDIOLOGY | Facility: CLINIC | Age: 77
Discharge: HOME | End: 2022-11-23
Attending: INTERNAL MEDICINE
Payer: COMMERCIAL

## 2022-11-23 DIAGNOSIS — R94.31 ABNORMAL ELECTROCARDIOGRAM: ICD-10-CM

## 2022-11-25 ENCOUNTER — ANCILLARY PROCEDURE (OUTPATIENT)
Dept: ULTRASOUND IMAGING | Facility: CLINIC | Age: 77
End: 2022-11-25
Attending: FAMILY MEDICINE
Payer: COMMERCIAL

## 2022-11-25 DIAGNOSIS — R22.1 THROAT SWELLING: ICD-10-CM

## 2022-11-25 PROCEDURE — 76536 US EXAM OF HEAD AND NECK: CPT | Mod: GC | Performed by: RADIOLOGY

## 2022-11-30 ENCOUNTER — OFFICE VISIT (OUTPATIENT)
Dept: FAMILY MEDICINE | Facility: CLINIC | Age: 77
End: 2022-11-30
Payer: COMMERCIAL

## 2022-11-30 VITALS
BODY MASS INDEX: 24.78 KG/M2 | DIASTOLIC BLOOD PRESSURE: 83 MMHG | OXYGEN SATURATION: 100 % | WEIGHT: 173.08 LBS | SYSTOLIC BLOOD PRESSURE: 145 MMHG | RESPIRATION RATE: 14 BRPM | HEIGHT: 70 IN | HEART RATE: 61 BPM | TEMPERATURE: 97 F

## 2022-11-30 DIAGNOSIS — K21.9 GASTROESOPHAGEAL REFLUX DISEASE, UNSPECIFIED WHETHER ESOPHAGITIS PRESENT: Primary | ICD-10-CM

## 2022-11-30 NOTE — PROGRESS NOTES
Medical assistant intake:  Alessandra Nieves is a 77 year old female who presents to [facility] today for RECHECK (Follow-up from previous visit. Wheezing still present. Prilosec is helping with lump in throat sensation. )        ASSESSMENT/PLAN:    Alessandra is a 76 yo with indigestion and an abnormal ECG and indigestion  1. Indigestion on Prilosec  - Keep up with the Prilosec for at least the next 6 weeks.     2. Abnormal ECG. Under the care of Cardiology.   -Has visit for CTA Angiogram  - If concerns, call [phone]    Follow-up : As needed when back from [place]    --Gorge Odom MD      SUBJECTIVE:   Alessandra is here for follow up of her visit with me on 11/21. At that time, had fullness in her throat and what felt like indigestion. Had an ECG with some question of anterior ischemia.     Given Prilosec, sent for Ultrasound of the neck, and also a referral to Cardiology as she had an abnormal ECG.     Since being seen by me, has had an Echocardiogram which was normal.  Also, had a visit with Cardiology, Dr. Martinez on 11/22.   Has a CTA angiogram scheduled for tomorrow.    The Ultrasound of her neck was normal.   Alessandra is feeling much better, but still with the sense of indigestion if she doesn't take the Prilosec.     Of note, home BP around 138/80.    Review Of Systems:    Has otherwise been in usual state of health    Problem list per EMR:  There is no problem list on file for this patient.      Current Outpatient Medications   Medication Sig Dispense Refill     albuterol (PROAIR HFA/PROVENTIL HFA/VENTOLIN HFA) 108 (90 Base) MCG/ACT inhaler Inhale 1-2 puffs into the lungs       ASPIRIN ADULT LOW STRENGTH PO Take 81 mg by mouth daily       dorzolamide (TRUSOPT) 2 % ophthalmic solution Apply 1 drop to eye       evolocumab (REPATHA SURECLICK) 140 MG/ML prefilled autoinjector Inject 140 mg Subcutaneous       hydrochlorothiazide (HYDRODIURIL) 25 MG tablet Take 25 mg by mouth       latanoprost (XALATAN)  "0.005 % ophthalmic solution        letrozole (FEMARA) 2.5 MG tablet Take 1 tablet by mouth daily       losartan (COZAAR) 25 MG tablet Take 1 tablet by mouth daily       omeprazole (PRILOSEC) 20 MG DR capsule Use 1 cap by mouth daily. 30 capsule 0     VITAMIN D, CHOLECALCIFEROL, PO Take 2,000 Units by mouth daily         Allergies   Allergen Reactions     Penicillins Shortness Of Breath          OBJECTIVE    Vitals: BP (!) 145/83 (BP Location: Left arm, Patient Position: Sitting, Cuff Size: Adult Regular)   Pulse 61   Temp 97  F (36.1  C)   Resp 14   Ht 1.77 m (5' 9.69\")   Wt 78.5 kg (173 lb 1.3 oz)   SpO2 100%   BMI 25.06 kg/m    BMI= Body mass index is 25.06 kg/m .     Appears well and in no distress.  CV: RRR. No murmurs  Lungs: CTA  Gait normal.     SEE TOP OF NOTE FOR ASSESSMENT AND PLAN    --Gorge Odom MD  [facility], Department of Family Medicine and Community Health  "

## 2022-11-30 NOTE — NURSING NOTE
"77 year old  Chief Complaint   Patient presents with     RECHECK     Follow-up from previous visit. Wheezing still present. Prilosec is helping with lump in throat sensation.        Blood pressure (!) 145/83, pulse 61, temperature 97  F (36.1  C), resp. rate 14, height 1.77 m (5' 9.69\"), weight 78.5 kg (173 lb 1.3 oz), SpO2 100 %. Body mass index is 25.06 kg/m .  There is no problem list on file for this patient.      Wt Readings from Last 2 Encounters:   11/30/22 78.5 kg (173 lb 1.3 oz)   11/22/22 77.7 kg (171 lb 6.4 oz)     BP Readings from Last 3 Encounters:   11/30/22 (!) 145/83   11/22/22 (!) 148/88   11/21/22 116/75         Current Outpatient Medications   Medication     albuterol (PROAIR HFA/PROVENTIL HFA/VENTOLIN HFA) 108 (90 Base) MCG/ACT inhaler     ASPIRIN ADULT LOW STRENGTH PO     dorzolamide (TRUSOPT) 2 % ophthalmic solution     evolocumab (REPATHA SURECLICK) 140 MG/ML prefilled autoinjector     hydrochlorothiazide (HYDRODIURIL) 25 MG tablet     latanoprost (XALATAN) 0.005 % ophthalmic solution     letrozole (FEMARA) 2.5 MG tablet     losartan (COZAAR) 25 MG tablet     omeprazole (PRILOSEC) 20 MG DR capsule     VITAMIN D, CHOLECALCIFEROL, PO     No current facility-administered medications for this visit.       Social History     Tobacco Use     Smoking status: Never     Smokeless tobacco: Never   Vaping Use     Vaping Use: Never used   Substance Use Topics     Alcohol use: Yes     Drug use: No       Health Maintenance Due   Topic Date Due     DEXA  Never done     ADVANCE CARE PLANNING  Never done     MAMMO SCREENING  Never done     COLORECTAL CANCER SCREENING  Never done     HEPATITIS C SCREENING  Never done     FALL RISK ASSESSMENT  Never done     ZOSTER IMMUNIZATION (2 of 3) 09/24/2012       No results found for: PAP      November 30, 2022 10:14 AM    "

## 2022-11-30 NOTE — PATIENT INSTRUCTIONS
ASSESSMENT/PLAN:    Alessandra is a 76 yo with indigestion and an abnormal ECG and indigestion  Indigestion on Prilosec  - Keep up with the Prilosec for at least the next 6 weeks.     2. Abnormal ECG. Under the care of Cardiology.   -Has visit for CTA Angiogram  - If concerns, call [phone]    Follow-up : As needed when back from [place]    --Gorge Odom MD

## 2022-12-01 ENCOUNTER — HOSPITAL ENCOUNTER (OUTPATIENT)
Dept: CARDIOLOGY | Facility: CLINIC | Age: 77
Discharge: HOME | End: 2022-12-01
Attending: INTERNAL MEDICINE
Payer: COMMERCIAL

## 2022-12-01 VITALS — HEART RATE: 67 BPM | SYSTOLIC BLOOD PRESSURE: 136 MMHG | DIASTOLIC BLOOD PRESSURE: 73 MMHG

## 2022-12-01 DIAGNOSIS — R94.31 ABNORMAL ELECTROCARDIOGRAM: ICD-10-CM

## 2022-12-01 DIAGNOSIS — R07.89 ATYPICAL CHEST PAIN: ICD-10-CM

## 2022-12-01 LAB
CREAT BLD-MCNC: 1 MG/DL (ref 0.5–1)
GFR SERPL CREATININE-BSD FRML MDRD: 58 ML/MIN/1.73M2

## 2022-12-01 PROCEDURE — 250N000013 HC RX MED GY IP 250 OP 250 PS 637: Performed by: INTERNAL MEDICINE

## 2022-12-01 PROCEDURE — 75574 CT ANGIO HRT W/3D IMAGE: CPT | Mod: 26 | Performed by: INTERNAL MEDICINE

## 2022-12-01 PROCEDURE — 82565 ASSAY OF CREATININE: CPT

## 2022-12-01 PROCEDURE — 250N000011 HC RX IP 250 OP 636: Performed by: INTERNAL MEDICINE

## 2022-12-01 PROCEDURE — 75574 CT ANGIO HRT W/3D IMAGE: CPT

## 2022-12-01 RX ORDER — IOPAMIDOL 755 MG/ML
110 INJECTION, SOLUTION INTRAVASCULAR ONCE
Status: COMPLETED | OUTPATIENT
Start: 2022-12-01 | End: 2022-12-01

## 2022-12-01 RX ORDER — ACYCLOVIR 200 MG/1
0-1 CAPSULE ORAL
Status: DISCONTINUED | OUTPATIENT
Start: 2022-12-01 | End: 2022-12-02 | Stop reason: HOSPADM

## 2022-12-01 RX ORDER — DIPHENHYDRAMINE HCL 25 MG
25 CAPSULE ORAL
Status: DISCONTINUED | OUTPATIENT
Start: 2022-12-01 | End: 2022-12-02 | Stop reason: HOSPADM

## 2022-12-01 RX ORDER — NITROGLYCERIN 0.4 MG/1
0.4 TABLET SUBLINGUAL
Status: DISCONTINUED | OUTPATIENT
Start: 2022-12-01 | End: 2022-12-02 | Stop reason: HOSPADM

## 2022-12-01 RX ORDER — METOPROLOL TARTRATE 1 MG/ML
5-15 INJECTION, SOLUTION INTRAVENOUS
Status: DISCONTINUED | OUTPATIENT
Start: 2022-12-01 | End: 2022-12-02 | Stop reason: HOSPADM

## 2022-12-01 RX ORDER — DILTIAZEM HCL 60 MG
120 TABLET ORAL
Status: DISCONTINUED | OUTPATIENT
Start: 2022-12-01 | End: 2022-12-02 | Stop reason: HOSPADM

## 2022-12-01 RX ORDER — METOPROLOL TARTRATE 25 MG/1
25-100 TABLET, FILM COATED ORAL
Status: COMPLETED | OUTPATIENT
Start: 2022-12-01 | End: 2022-12-01

## 2022-12-01 RX ORDER — METHYLPREDNISOLONE SODIUM SUCCINATE 125 MG/2ML
125 INJECTION, POWDER, LYOPHILIZED, FOR SOLUTION INTRAMUSCULAR; INTRAVENOUS
Status: DISCONTINUED | OUTPATIENT
Start: 2022-12-01 | End: 2022-12-02 | Stop reason: HOSPADM

## 2022-12-01 RX ORDER — DILTIAZEM HYDROCHLORIDE 5 MG/ML
10-15 INJECTION INTRAVENOUS
Status: DISCONTINUED | OUTPATIENT
Start: 2022-12-01 | End: 2022-12-02 | Stop reason: HOSPADM

## 2022-12-01 RX ORDER — DIPHENHYDRAMINE HYDROCHLORIDE 50 MG/ML
25-50 INJECTION INTRAMUSCULAR; INTRAVENOUS
Status: DISCONTINUED | OUTPATIENT
Start: 2022-12-01 | End: 2022-12-02 | Stop reason: HOSPADM

## 2022-12-01 RX ORDER — IVABRADINE 5 MG/1
5-15 TABLET, FILM COATED ORAL
Status: COMPLETED | OUTPATIENT
Start: 2022-12-01 | End: 2022-12-01

## 2022-12-01 RX ORDER — ONDANSETRON 2 MG/ML
4 INJECTION INTRAMUSCULAR; INTRAVENOUS
Status: DISCONTINUED | OUTPATIENT
Start: 2022-12-01 | End: 2022-12-02 | Stop reason: HOSPADM

## 2022-12-01 RX ADMIN — METOPROLOL TARTRATE 100 MG: 50 TABLET, FILM COATED ORAL at 13:30

## 2022-12-01 RX ADMIN — IOPAMIDOL 110 ML: 755 INJECTION, SOLUTION INTRAVENOUS at 15:08

## 2022-12-01 RX ADMIN — IVABRADINE 10 MG: 5 TABLET, FILM COATED ORAL at 13:30

## 2022-12-03 ENCOUNTER — HEALTH MAINTENANCE LETTER (OUTPATIENT)
Age: 77
End: 2022-12-03

## 2022-12-06 ENCOUNTER — DOCUMENTATION ONLY (OUTPATIENT)
Dept: CARDIOLOGY | Facility: CLINIC | Age: 77
End: 2022-12-06

## 2022-12-06 NOTE — PROGRESS NOTES
Contacted Alessandra to discuss the results of her recent cardiac studies, summarized below. Chest pain has stably resolved since she was started on a PPI by her PCP.    TTE 11/23/22: Normal LV/RV size/function/thickness. No significant valvular abnormalities.    Coronary CTA 12/1/22: Mild non-obstructive CAD (mild mLAD, otherwise minimal diffuse CAD.) Agatston score 304.    Radiology over-read on 12/1/22 coronary CTA: 2 cm pulmonary nodule. Optional follow up CT in 12 months was suggested for high-risk patients only.     A/P:  1. Minimal non-obstructive CAD (coronary CTA 12/1/22):   Discussed that non-obstructive CAD does target her for aggressive preventative therapy. Chest pain has resolved without recurrence with PPI and is likely gastrointestinal in etiology.  -continue ASA 81 mg daily  -continue PCSK9 inhibitor (intolerant of multiple statins per 4/29/22 note from [facility], [place])    2. RLL pulmonary nodule on coronary CTA 12/1/22, incidental:  -consider non-contrast CT chest in 12 months to reassess pulmonary nodules  -she is going to discuss this with her Oncologist who follows her for her previous breast cancer    3. T-wave inversions on ECG:   -no structural correlate for this on TTE.    I offered her either as-needed or annual follow up for her non-obstructive CAD; she opted for the former.     Plan for Cardiology follow up on as-needed basis.     Gurmeet Martinez MD  Cardiology

## 2022-12-06 NOTE — PROGRESS NOTES
Contacted Alessandra to discuss the results of her recent cardiac studies, summarized below. Chest pain has stably resolved since she was started on a PPI by her PCP.     TTE 11/23/22: Normal LV/RV size/function/thickness. No significant valvular abnormalities.     Coronary CTA 12/1/22: Mild non-obstructive CAD (mild mLAD, otherwise minimal diffuse CAD.) Agatston score 304.     Radiology over-read on 12/1/22 coronary CTA: 2 cm pulmonary nodule. Optional follow up CT in 12 months was suggested for high-risk patients only.      A/P:  1. Minimal non-obstructive CAD (coronary CTA 12/1/22):   Discussed that non-obstructive CAD does target her for aggressive preventative therapy. Chest pain has resolved without recurrence with PPI and is likely gastrointestinal in etiology.  -continue ASA 81 mg daily  -continue PCSK9 inhibitor (intolerant of multiple statins per 4/29/22 note from [facility], [place])     2. RLL pulmonary nodule on coronary CTA 12/1/22, incidental:  -consider non-contrast CT chest in 12 months to reassess pulmonary nodules  -she is going to discuss this with her Oncologist who follows her for her previous breast cancer     3. T-wave inversions on ECG:   -no structural correlate for this on TTE.     I offered her either as-needed or annual follow up for her non-obstructive CAD; she opted for the former.      Plan for Cardiology follow up on as-needed basis.      Gurmeet Martinez MD  Cardiology

## 2022-12-13 DIAGNOSIS — K21.9 GASTROESOPHAGEAL REFLUX DISEASE, UNSPECIFIED WHETHER ESOPHAGITIS PRESENT: ICD-10-CM

## 2022-12-14 NOTE — TELEPHONE ENCOUNTER
Last visit 11/30/22, no future visit  Prescription approved per [facility] Refill Protocol.  Sharon Valdivia RN  [facility]

## 2022-12-28 DIAGNOSIS — K21.9 GASTROESOPHAGEAL REFLUX DISEASE, UNSPECIFIED WHETHER ESOPHAGITIS PRESENT: ICD-10-CM

## 2023-05-22 ENCOUNTER — OFFICE VISIT (OUTPATIENT)
Dept: FAMILY MEDICINE | Facility: CLINIC | Age: 78
End: 2023-05-22
Payer: COMMERCIAL

## 2023-05-22 VITALS
BODY MASS INDEX: 24.9 KG/M2 | HEART RATE: 63 BPM | SYSTOLIC BLOOD PRESSURE: 129 MMHG | WEIGHT: 172 LBS | RESPIRATION RATE: 17 BRPM | DIASTOLIC BLOOD PRESSURE: 81 MMHG | OXYGEN SATURATION: 99 % | TEMPERATURE: 97.3 F

## 2023-05-22 DIAGNOSIS — R10.13 ABDOMINAL PAIN, EPIGASTRIC: Primary | ICD-10-CM

## 2023-05-22 NOTE — PROGRESS NOTES
Medical assistant intake:  Alessandra Nieves is a 78 year old female who presents to [facility] today for Gastrointestinal Problem (Feeling of soreness from stomach through intestines. Was ongoing for about 2 weeks prior to making appointment, started taking a probiotic and has helped sx. Issues with swallowing -- one instance trying to swallow water and it got caught, notes feelings of 'fullness' where felt like air was stuck in throat. Happened 3-4x since last visit.) and Hypertension (Just back from [place], was getting high readings with home BP machine --  took her to ER, numbers normalized, was dx with conjunctivitis. Thinks home monitor may be inaccurate. )        ASSESSMENT/PLAN:    1. Hypertension.      -no change in medicine  - Discussed low salt and high veggie diet. Minimize mouthwash use  - Return to clinic for a nurse only BP check with your home BP cuff.     2. Gastrointestinal issues, (1) GI upset- improving and (2) near choking    -the Choking episodes do not seem like an esophageal narrowing as only occurring rarely and with first few bites.   - If symptoms increase, let me know and we can refer to GI for an endoscopy and/or other evaluations.       Follow-up - As needed.     --Gorge Odom MD      SUBJECTIVE:   Joaquín is here for a few issues.   The simplest is her blood pressure. Was getting high readings, but then normal at [facility] and feels that it may be that her BP cuff is inaccurate.   Drinking only 1 cup of coffee/day.   No other caffeine.   Taking Losartan and hydrochlorothiazide   Trying to eat low salt down.   Diet is still healthy.     Also, has recently had gastrointestinal irritation.   Started probiotics and that has helped greatly. Still not 100%, but definitely better.     Has had some near choking episodes 3 times over the past 3 months. Not regular. And, always at the start of swallowing.     Review Of Systems:    See subjective.   Has otherwise been in usual state of  health, e.g.   Cardiovascular: negative  Respiratory: No shortness of breath, dyspnea on exertion, cough, or hemoptysis  Genitourinary: negative    Problem list per EMR:  There is no problem list on file for this patient.      Current Outpatient Medications   Medication Sig Dispense Refill     albuterol (PROAIR HFA/PROVENTIL HFA/VENTOLIN HFA) 108 (90 Base) MCG/ACT inhaler Inhale 1-2 puffs into the lungs       ASPIRIN ADULT LOW STRENGTH PO Take 81 mg by mouth daily       dorzolamide (TRUSOPT) 2 % ophthalmic solution Apply 1 drop to eye       evolocumab (REPATHA SURECLICK) 140 MG/ML prefilled autoinjector Inject 140 mg Subcutaneous       hydrochlorothiazide (HYDRODIURIL) 25 MG tablet Take 25 mg by mouth       latanoprost (XALATAN) 0.005 % ophthalmic solution        letrozole (FEMARA) 2.5 MG tablet Take 1 tablet by mouth daily       losartan (COZAAR) 25 MG tablet Take 1 tablet by mouth daily       omeprazole (PRILOSEC) 20 MG DR capsule TAKE 1 CAPSULE BY MOUTH EVERY DAY 90 capsule 1     VITAMIN D, CHOLECALCIFEROL, PO Take 2,000 Units by mouth daily         Allergies   Allergen Reactions     Penicillins Shortness Of Breath        Social:   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       OBJECTIVE    Vitals: /81 (BP Location: Left arm, Patient Position: Sitting, Cuff Size: Adult Regular)   Pulse 63   Temp 97.3  F (36.3  C) (Temporal)   Resp 17   Wt 78 kg (172 lb)   SpO2 99%   BMI 24.90 kg/m    BMI= Body mass index is 24.9 kg/m .  Alessandra appears in good health.    HEENT is unremarkable.  Her neck is supple and without lymphadenopathy.  Also, her oropharynx is clear.    Cardiovascular-regular rate and rhythm without murmur    Lungs-clear to auscultation throughout    Abdomen- no hepatosplenomegaly.  No guarding.  No rebound.  Normal bowel sounds.    Extremities-no edema    SEE TOP OF NOTE FOR ASSESSMENT AND PLAN    --Gorge Odom MD  [facility], Department of Family Medicine  and Community Health

## 2023-05-22 NOTE — PATIENT INSTRUCTIONS
ASSESSMENT/PLAN:    Hypertension.      -no change in medicine  - Discussed low salt and high veggie diet. Minimize mouthwash use  - Return to clinic for a nurse only BP check with your home BP cuff.     2. Gastrointestinal issues, (1) GI upset- improving and (2) near choking    -the Choking episodes do not seem like an esophageal narrowing as only occurring rarely and with first few bites.   - If symptoms increase, let me know and we can refer to GI for an endoscopy and/or other evaluations.       Follow-up - As needed.

## 2023-05-22 NOTE — NURSING NOTE
78 year old  Chief Complaint   Patient presents with     Gastrointestinal Problem     Feeling of soreness from stomach through intestines. Was ongoing for about 2 weeks prior to making appointment, started taking a probiotic and has helped sx. Issues with swallowing -- one instance trying to swallow water and it got caught, notes feelings of 'fullness' where felt like air was stuck in throat. Happened 3-4x since last visit.     Hypertension     Just back from [place], was getting high readings with home BP machine --  took her to ER, numbers normalized, was dx with conjunctivitis. Thinks home monitor may be inaccurate.        Blood pressure 129/81, pulse 63, temperature 97.3  F (36.3  C), temperature source Temporal, resp. rate 17, weight 78 kg (172 lb), SpO2 99 %. Body mass index is 24.9 kg/m .  There is no problem list on file for this patient.      Wt Readings from Last 2 Encounters:   05/22/23 78 kg (172 lb)   11/30/22 78.5 kg (173 lb 1.3 oz)     BP Readings from Last 3 Encounters:   05/22/23 129/81   12/01/22 136/73   11/30/22 (!) 145/83         Current Outpatient Medications   Medication     albuterol (PROAIR HFA/PROVENTIL HFA/VENTOLIN HFA) 108 (90 Base) MCG/ACT inhaler     ASPIRIN ADULT LOW STRENGTH PO     dorzolamide (TRUSOPT) 2 % ophthalmic solution     evolocumab (REPATHA SURECLICK) 140 MG/ML prefilled autoinjector     hydrochlorothiazide (HYDRODIURIL) 25 MG tablet     latanoprost (XALATAN) 0.005 % ophthalmic solution     letrozole (FEMARA) 2.5 MG tablet     losartan (COZAAR) 25 MG tablet     omeprazole (PRILOSEC) 20 MG DR capsule     VITAMIN D, CHOLECALCIFEROL, PO     No current facility-administered medications for this visit.       Social History     Tobacco Use     Smoking status: Never     Smokeless tobacco: Never   Vaping Use     Vaping status: Never Used   Substance Use Topics     Alcohol use: Yes     Drug use: No       Health Maintenance Due   Topic Date Due     DEXA  Never done     ADVANCE  CARE PLANNING  Never done     HEPATITIS C SCREENING  Never done     FALL RISK ASSESSMENT  Never done     ZOSTER IMMUNIZATION (2 of 3) 09/24/2012     COVID-19 Vaccine (4 - Pfizer series) 01/16/2023       No results found for: PAP      May 22, 2023 8:32 AM

## 2023-09-01 ENCOUNTER — ALLIED HEALTH/NURSE VISIT (OUTPATIENT)
Dept: FAMILY MEDICINE | Facility: CLINIC | Age: 78
End: 2023-09-01
Payer: COMMERCIAL

## 2023-09-01 DIAGNOSIS — I10 HYPERTENSION, UNSPECIFIED TYPE: Primary | ICD-10-CM

## 2023-09-01 NOTE — PROGRESS NOTES
Alessandra Nieves is a 78 year old year old patient who comes in today for a Blood Pressure check because of ongoing blood pressure monitoring.  Vital Signs as repeated by MATHEUS Magdaleno    Clinic machine: 147/83  Home machine: 164/96    Pt states that she typically notices the second reading on home machine is closer to numbers that were acquired with clinic machine.    Patient is taking medication as prescribed  Patient is tolerating medications well.  Patient is not currently monitoring blood pressure at home.  Current complaints: none  Disposition:  patient instructed to monitor BP at home daily for next two weeks and send in readings in person or through MyChart. Discussed optimal position and timing for BP monitoring, and to take two readings a couple of minutes apart.    Rasta VIZCARRA, RN  09/01/23 9:39 AM

## 2023-09-11 ENCOUNTER — TELEPHONE (OUTPATIENT)
Dept: FAMILY MEDICINE | Facility: CLINIC | Age: 78
End: 2023-09-11

## 2023-09-11 NOTE — TELEPHONE ENCOUNTER
Average BP from one week of home readings is 141/86. Alessandra currently takes 25 mg losartan and 25 mg hydrochlorothiazide daily. Will share with Dr. Odom to see if he would like to adjust her medication.    Rasta VIZCARRA, RN  09/11/23 1:01 PM

## 2023-09-18 DIAGNOSIS — I10 HYPERTENSION, UNSPECIFIED TYPE: Primary | ICD-10-CM

## 2023-09-18 RX ORDER — LOSARTAN POTASSIUM 50 MG/1
100 TABLET ORAL DAILY
Qty: 120 TABLET | Refills: 1 | Status: SHIPPED | OUTPATIENT
Start: 2023-09-18 | End: 2023-12-15

## 2023-09-26 DIAGNOSIS — I10 HYPERTENSION, UNSPECIFIED TYPE: Primary | ICD-10-CM

## 2023-09-26 RX ORDER — AMLODIPINE BESYLATE 2.5 MG/1
2.5 TABLET ORAL AT BEDTIME
Qty: 30 TABLET | Refills: 0 | Status: SHIPPED | OUTPATIENT
Start: 2023-09-26 | End: 2023-10-03

## 2023-10-03 ENCOUNTER — OFFICE VISIT (OUTPATIENT)
Dept: FAMILY MEDICINE | Facility: CLINIC | Age: 78
End: 2023-10-03
Payer: COMMERCIAL

## 2023-10-03 VITALS
DIASTOLIC BLOOD PRESSURE: 69 MMHG | SYSTOLIC BLOOD PRESSURE: 108 MMHG | BODY MASS INDEX: 25.12 KG/M2 | WEIGHT: 173.5 LBS | HEART RATE: 69 BPM | RESPIRATION RATE: 97 BRPM | TEMPERATURE: 97.7 F

## 2023-10-03 DIAGNOSIS — I10 HYPERTENSION, UNSPECIFIED TYPE: ICD-10-CM

## 2023-10-03 DIAGNOSIS — Z13.1 SCREENING FOR DIABETES MELLITUS: ICD-10-CM

## 2023-10-03 DIAGNOSIS — E78.5 HYPERLIPIDEMIA LDL GOAL <100: ICD-10-CM

## 2023-10-03 DIAGNOSIS — I10 HYPERTENSION, ESSENTIAL: Primary | ICD-10-CM

## 2023-10-03 LAB
ANION GAP SERPL CALCULATED.3IONS-SCNC: 11 MMOL/L (ref 7–15)
BUN SERPL-MCNC: 11.8 MG/DL (ref 8–23)
CALCIUM SERPL-MCNC: 10.2 MG/DL (ref 8.8–10.2)
CHLORIDE SERPL-SCNC: 104 MMOL/L (ref 98–107)
CHOLEST SERPL-MCNC: 179 MG/DL
CREAT SERPL-MCNC: 0.85 MG/DL (ref 0.51–0.95)
DEPRECATED HCO3 PLAS-SCNC: 25 MMOL/L (ref 22–29)
EGFRCR SERPLBLD CKD-EPI 2021: 71 ML/MIN/1.73M2
GLUCOSE SERPL-MCNC: 88 MG/DL (ref 70–99)
HBA1C MFR BLD: 5.3 % (ref 0–5.6)
HDLC SERPL-MCNC: 77 MG/DL
LDLC SERPL CALC-MCNC: 85 MG/DL
NONHDLC SERPL-MCNC: 102 MG/DL
POTASSIUM SERPL-SCNC: 3.9 MMOL/L (ref 3.4–5.3)
SODIUM SERPL-SCNC: 140 MMOL/L (ref 135–145)
TRIGL SERPL-MCNC: 84 MG/DL

## 2023-10-03 PROCEDURE — 80048 BASIC METABOLIC PNL TOTAL CA: CPT | Mod: ORL | Performed by: FAMILY MEDICINE

## 2023-10-03 PROCEDURE — 80061 LIPID PANEL: CPT | Mod: ORL | Performed by: FAMILY MEDICINE

## 2023-10-03 RX ORDER — AMLODIPINE BESYLATE 2.5 MG/1
2.5 TABLET ORAL AT BEDTIME
Qty: 90 TABLET | Refills: 3 | Status: SHIPPED | OUTPATIENT
Start: 2023-10-03

## 2023-10-03 NOTE — PROGRESS NOTES
Medical assistant intake:  Alessandra Nieves is a 78 year old female who presents to [facility] today for Hypertension (This AM, home BP was 129/73 and 122/73, overall BP has come down since med change - is now taking amlodipine 2.5mg, losartan 100mg, and hydrochlorothiazide 25mg daily)       -  ASSESSMENT and PLAN:    - Hypertension, now appears controlled on Losartan 100 mg/day, hydrochlorothiazide 25 mg /day and Amlodipine 2.5 mg/day  Continue on above.   Check Electrolytes. If potassium low, consider decreasing the hydrochlorothiazide to 12.5mg  Let us know if you need refills prior to going to [place].         - Hyperlipidemia, check Lipids    Follow-up as needed    Gorge Odom MD  Family Medicine and Sports Medicine  [facility]      SUBJECTIVE:   Here for followup of BP. She had been using Losartan  at 100 mg/day, hydrochlorothiazide at 25 mg/day and recently added amlodipine at 2.5 mg/day.  Her at home blood pressures have improved greatly and this morning was 129/73 and 122/73.  She is having no side effects with the amlodipine.  She is otherwise feeling well.  She has not noticed any edema.    Review Of Systems:    See subjective.   Has otherwise been in usual state of health, e.g.   Cardiovascular: negative  Respiratory: No shortness of breath, dyspnea on exertion, cough, or hemoptysis  Gastrointestinal: negative  Genitourinary: negative    Problem list per EMR:  There is no problem list on file for this patient.      Current Outpatient Medications   Medication Sig Dispense Refill    albuterol (PROAIR HFA/PROVENTIL HFA/VENTOLIN HFA) 108 (90 Base) MCG/ACT inhaler Inhale 1-2 puffs into the lungs      amLODIPine (NORVASC) 2.5 MG tablet Take 1 tablet (2.5 mg) by mouth At Bedtime 30 tablet 0    ASPIRIN ADULT LOW STRENGTH PO Take 81 mg by mouth daily      dorzolamide (TRUSOPT) 2 % ophthalmic solution Apply 1 drop to eye      evolocumab (REPATHA SURECLICK) 140 MG/ML prefilled autoinjector Inject  140 mg Subcutaneous      hydrochlorothiazide (HYDRODIURIL) 25 MG tablet Take 25 mg by mouth      latanoprost (XALATAN) 0.005 % ophthalmic solution       letrozole (FEMARA) 2.5 MG tablet Take 1 tablet by mouth daily      losartan (COZAAR) 50 MG tablet Take 2 tablets (100 mg) by mouth daily 120 tablet 1    omeprazole (PRILOSEC) 20 MG DR capsule TAKE 1 CAPSULE BY MOUTH EVERY DAY 90 capsule 1    VITAMIN D, CHOLECALCIFEROL, PO Take 2,000 Units by mouth daily         Allergies   Allergen Reactions    Penicillins Shortness Of Breath        Social:     Unchanged.  About to move to [place] for the winter time.    OBJECTIVE    Vitals: /69 (BP Location: Right arm, Patient Position: Sitting, Cuff Size: Adult Regular)   Pulse 69   Temp 97.7  F (36.5  C) (Skin)   Resp (!) 97   Wt 78.7 kg (173 lb 8 oz)   BMI 25.12 kg/m    BMI= Body mass index is 25.12 kg/m .    Appears well in no distress.    Cardiovascular-regular rate and rhythm without murmur    Lungs-clear to auscultation throughout    Extremities-no edema. Normal gait.    SEE TOP OF NOTE FOR ASSESSMENT AND PLAN    --Gorge Odom MD  [facility], Department of Family Medicine and Community Health

## 2023-10-03 NOTE — PATIENT INSTRUCTIONS
-  ASSESSMENT and PLAN:    - Hypertension, now appears controlled on Losartan 100 mg/day, hydrochlorothiazide 25 mg /day and Amlodipine 2.5 mg/day  Continue on above.   Check Electrolytes. If potassium low, consider decreasing the hydrochlorothiazide to 12.5mg  Let us know if you need refills prior to going to [place].         - Hyperlipidemia, check Lipids    Follow-up as needed    Gorge Odom MD  Family Medicine and Sports Medicine  [facility]

## 2023-10-03 NOTE — NURSING NOTE
78 year old    Chief Complaint   Patient presents with    Hypertension     This AM, home BP was 129/73 and 122/73, overall BP has come down since med change - is now taking amlodipine 2.5mg, losartan 100mg, and hydrochlorothiazide 25mg daily            Blood pressure 108/69, pulse 69, temperature 97.7  F (36.5  C), temperature source Skin, resp. rate (!) 97, weight 78.7 kg (173 lb 8 oz). Body mass index is 25.12 kg/m .  There is no problem list on file for this patient.             Wt Readings from Last 2 Encounters:   10/03/23 78.7 kg (173 lb 8 oz)   05/22/23 78 kg (172 lb)       BP Readings from Last 3 Encounters:   10/03/23 108/69   05/22/23 129/81   12/01/22 136/73                Current Outpatient Medications   Medication    albuterol (PROAIR HFA/PROVENTIL HFA/VENTOLIN HFA) 108 (90 Base) MCG/ACT inhaler    amLODIPine (NORVASC) 2.5 MG tablet    ASPIRIN ADULT LOW STRENGTH PO    dorzolamide (TRUSOPT) 2 % ophthalmic solution    evolocumab (REPATHA SURECLICK) 140 MG/ML prefilled autoinjector    hydrochlorothiazide (HYDRODIURIL) 25 MG tablet    latanoprost (XALATAN) 0.005 % ophthalmic solution    letrozole (FEMARA) 2.5 MG tablet    losartan (COZAAR) 50 MG tablet    omeprazole (PRILOSEC) 20 MG DR capsule    VITAMIN D, CHOLECALCIFEROL, PO     No current facility-administered medications for this visit.              Social History     Tobacco Use    Smoking status: Never    Smokeless tobacco: Never   Vaping Use    Vaping Use: Never used   Substance Use Topics    Alcohol use: Yes    Drug use: No              Health Maintenance Due   Topic Date Due    DEXA  Never done    ADVANCE CARE PLANNING  Never done    HEPATITIS C SCREENING  Never done    FALL RISK ASSESSMENT  Never done    MEDICARE ANNUAL WELLNESS VISIT  08/17/2023    COVID-19 Vaccine (5 - 2023-24 season) 09/01/2023    ZOSTER IMMUNIZATION (3 of 3) 08/02/2023            No results found for: PAP           October 3, 2023 8:53 AM

## 2023-11-04 ENCOUNTER — HEALTH MAINTENANCE LETTER (OUTPATIENT)
Age: 78
End: 2023-11-04

## 2023-11-17 DIAGNOSIS — I10 HYPERTENSION, UNSPECIFIED TYPE: ICD-10-CM

## 2023-11-20 RX ORDER — LOSARTAN POTASSIUM 50 MG/1
100 TABLET ORAL DAILY
Qty: 180 TABLET | Refills: 2 | OUTPATIENT
Start: 2023-11-20

## 2023-12-15 DIAGNOSIS — I10 HYPERTENSION, UNSPECIFIED TYPE: ICD-10-CM

## 2023-12-15 RX ORDER — LOSARTAN POTASSIUM 50 MG/1
100 TABLET ORAL DAILY
Qty: 120 TABLET | Refills: 1 | Status: SHIPPED | OUTPATIENT
Start: 2023-12-15 | End: 2024-03-25

## 2023-12-15 NOTE — TELEPHONE ENCOUNTER
Losartan (Cozaar) 50 mg    Last Office Visit: 10/3/23  [facility] Appointments: None  Medication last refilled: 9/18/23 #120 with 1 refill(s)    Vital Signs Systolic Diastolic Pulse   10/3/23 108 69 69   12/1/22 136 73 67   8/17/22 110 70 67     Required labs per protocol:    LAB REF RANGE 8/19/22 10/3/23   Creatinine 0.67-1.17 mg/dL 0.87 0.85   Potassium 3.4-5.3 mmol/L 3.9 3.9   Sodium 137.3-146.3 mmol/L 137 140     Prescription approved per [facility] Refill Protocol.    ASHLEY PeterN, RN, CCM

## 2024-03-23 ENCOUNTER — HEALTH MAINTENANCE LETTER (OUTPATIENT)
Age: 79
End: 2024-03-23

## 2024-03-23 DIAGNOSIS — I10 HYPERTENSION, UNSPECIFIED TYPE: ICD-10-CM

## 2024-03-25 RX ORDER — LOSARTAN POTASSIUM 50 MG/1
100 TABLET ORAL DAILY
Qty: 180 TABLET | Refills: 1 | Status: SHIPPED | OUTPATIENT
Start: 2024-03-25

## 2024-03-25 NOTE — TELEPHONE ENCOUNTER
Medication requested: losartan (COZAAR) 50 MG tablet   Last office visit: 10/06/23  [facility] appointments: none  Medication last refilled:   Last qualifying labs:   BP Readings from Last 3 Encounters:   10/03/23 108/69   05/22/23 129/81   12/01/22 136/73     Component      Latest Ref Rng 10/3/2023  9:16 AM   Potassium      3.4 - 5.3 mmol/L 3.9      Component      Latest Ref Rng 10/3/2023  9:16 AM   Creatinine      0.51 - 0.95 mg/dL 0.85      Prescription approved per [facility] Refill Protocol.    Rasta VIZCARRA, RN  03/25/24 12:56 PM

## 2024-04-05 ENCOUNTER — NURSE TRIAGE (OUTPATIENT)
Dept: FAMILY MEDICINE | Facility: CLINIC | Age: 79
End: 2024-04-05

## 2024-04-05 RX ORDER — HYDROCHLOROTHIAZIDE 25 MG/1
12.5 TABLET ORAL
COMMUNITY
Start: 2024-04-05

## 2024-04-05 NOTE — TELEPHONE ENCOUNTER
"Pt reports dizziness upon standing up straight after bending over for the last 3-4 days and she is \"unable to do activities like gardening.\"  Today's automatic home BP monitor reading is 107/69 and yesterday was 114/71, and she cannot find the piece of paper with additional readings but says they were like the ones reported.   Her typical BP readings are 130's over 70-80's.  Reports this has happened before where she feels lightheaded and her BP is below 120/60 and her BP medication was adjusted.  Reports drinking adequate fluids and no recent illness.  Dr. Odom's last exchange with pt was via [employer] message last October when pt received a new BP machine and reported her readings. At that time, he indicated he would decrease the hydrochlorothiazide from 25 to 12.5 mg/day if she developed any lightheadedness or consistently lower numbers.   Forwarding to Dr. Odom for consideration.    ZACKERY Redmond, RN  04/05/24, 2:48 PM    4:30pm, I phoned Alessandra and reviewed the above with her.   Asked her to decrease the hydrochlorothiazide to 1/2 tab daily and continue to monitor. She was in agreement.   --Gorge Odom MD      "

## 2024-04-05 NOTE — TELEPHONE ENCOUNTER
General (use ONLY if request does not fit into other dot phrase categories)    Who is calling - Patient  Reason for call - Low blood pressure   Action desired - Call back  Return call needed? Yes  Advised patient that response may take up to 1-2 business days? Yes  Ok to leave a message on VM? Yes

## 2024-04-17 ENCOUNTER — TRANSFERRED RECORDS (OUTPATIENT)
Dept: MULTI SPECIALTY CLINIC | Facility: CLINIC | Age: 79
End: 2024-04-17

## 2024-07-23 SDOH — HEALTH STABILITY: PHYSICAL HEALTH: ON AVERAGE, HOW MANY DAYS PER WEEK DO YOU ENGAGE IN MODERATE TO STRENUOUS EXERCISE (LIKE A BRISK WALK)?: 3 DAYS

## 2024-07-23 SDOH — HEALTH STABILITY: PHYSICAL HEALTH: ON AVERAGE, HOW MANY MINUTES DO YOU ENGAGE IN EXERCISE AT THIS LEVEL?: 40 MIN

## 2024-07-23 ASSESSMENT — SOCIAL DETERMINANTS OF HEALTH (SDOH): HOW OFTEN DO YOU GET TOGETHER WITH FRIENDS OR RELATIVES?: TWICE A WEEK

## 2024-07-24 ENCOUNTER — LAB (OUTPATIENT)
Dept: LAB | Facility: CLINIC | Age: 79
End: 2024-07-24
Payer: COMMERCIAL

## 2024-07-24 ENCOUNTER — OFFICE VISIT (OUTPATIENT)
Dept: FAMILY MEDICINE | Facility: CLINIC | Age: 79
End: 2024-07-24
Payer: COMMERCIAL

## 2024-07-24 VITALS
DIASTOLIC BLOOD PRESSURE: 71 MMHG | RESPIRATION RATE: 16 BRPM | HEART RATE: 60 BPM | OXYGEN SATURATION: 99 % | WEIGHT: 170 LBS | BODY MASS INDEX: 25.76 KG/M2 | TEMPERATURE: 98.2 F | SYSTOLIC BLOOD PRESSURE: 114 MMHG | HEIGHT: 68 IN

## 2024-07-24 DIAGNOSIS — M85.80 OSTEOPENIA, UNSPECIFIED LOCATION: ICD-10-CM

## 2024-07-24 DIAGNOSIS — E78.5 HYPERLIPIDEMIA LDL GOAL <100: ICD-10-CM

## 2024-07-24 DIAGNOSIS — R93.1 AGATSTON CORONARY ARTERY CALCIUM SCORE BETWEEN 100 AND 400: ICD-10-CM

## 2024-07-24 DIAGNOSIS — C50.911: ICD-10-CM

## 2024-07-24 DIAGNOSIS — Z00.00 ANNUAL PHYSICAL EXAM: Primary | ICD-10-CM

## 2024-07-24 DIAGNOSIS — Z13.228 SCREENING FOR METABOLIC DISORDER: ICD-10-CM

## 2024-07-24 DIAGNOSIS — I10 PRIMARY HYPERTENSION: ICD-10-CM

## 2024-07-24 DIAGNOSIS — C50.912: ICD-10-CM

## 2024-07-24 DIAGNOSIS — R07.9 CHEST PAIN, UNSPECIFIED TYPE: ICD-10-CM

## 2024-07-24 LAB
ANION GAP SERPL CALCULATED.3IONS-SCNC: 11 MMOL/L (ref 7–15)
BUN SERPL-MCNC: 9.8 MG/DL (ref 8–23)
CALCIUM SERPL-MCNC: 10.4 MG/DL (ref 8.8–10.4)
CHLORIDE SERPL-SCNC: 103 MMOL/L (ref 98–107)
CHOLEST SERPL-MCNC: 218 MG/DL
CREAT SERPL-MCNC: 0.87 MG/DL (ref 0.51–0.95)
EGFRCR SERPLBLD CKD-EPI 2021: 68 ML/MIN/1.73M2
FASTING STATUS PATIENT QL REPORTED: YES
FASTING STATUS PATIENT QL REPORTED: YES
GLUCOSE SERPL-MCNC: 87 MG/DL (ref 70–99)
HCO3 SERPL-SCNC: 26 MMOL/L (ref 22–29)
HDLC SERPL-MCNC: 103 MG/DL
LDLC SERPL CALC-MCNC: 98 MG/DL
NONHDLC SERPL-MCNC: 115 MG/DL
POTASSIUM SERPL-SCNC: 4 MMOL/L (ref 3.4–5.3)
SODIUM SERPL-SCNC: 140 MMOL/L (ref 135–145)
TRIGL SERPL-MCNC: 87 MG/DL

## 2024-07-24 PROCEDURE — 36415 COLL VENOUS BLD VENIPUNCTURE: CPT | Performed by: PATHOLOGY

## 2024-07-24 PROCEDURE — 80061 LIPID PANEL: CPT | Performed by: PATHOLOGY

## 2024-07-24 PROCEDURE — 80048 BASIC METABOLIC PNL TOTAL CA: CPT | Performed by: PATHOLOGY

## 2024-07-24 NOTE — PROGRESS NOTES
Preventive Care Visit  [facility]    Alessandra Nieves is a 79 year old female who presents today to the [facility] for an annual exam.       ASSESSMENT/PLAN:    Annual Exam/Preventive Issues   -Check Lipids and BMP  -Needs TDAP. Will go to pharmacy for that.   - Gets mammograms through oncology  - Increase strength training  - Will send us a copy of Advance Directives    -Specific concerns:     1. Substernal chest pain that is likely due to Gastroesophageal reflux  - Discussed not eating before bedtime.   - Take short walks after meals to avoid sitting or lying  - OK to use Tums  - Can also use Over the counter, Omeprazole    2. Hypertension, well controlled    -Follow up: as needed    Gorge Odom MD  Family Medicine and Sports Medicine    INTRODUCTION:   Alessandra has been seen at our clinic for the past 2 years.   Formally, she has had much of her care down in [facility] where her and her , Terry López spend most of the winter.       Had breast cancer about 10 years ago. Had surgery and radiation. Was on Letrozole. Stopped that last year. Now having annual mammograms with Oncology here in [place].     Has high cholesterol and . Was intolerant of Rosuvastatin. Now on Repatha and she has no problems with it.      Has hypertension. BPs have been lower at about 125/75. She's down to 12.5 mg of hydrochlorothiazide as she was lightheaded before. Also, takes Amlodipine and Losartan in the evenings.        Has a strong family history of hypertension and hypercholesterolemia.   Spends half the year in [place].    She's doing really well. Gets a substernal  palpable discomfort sometimes when sitting midday. When asked whether it could be GERD, she agreed that it may be after meals. No problems with exercise. Still going on brisk walks for 40 minutes.     Patient Active Problem List   Diagnosis    Hyperlipidemia LDL goal <100    Infiltrating ductal carcinoma of bilateral female breasts (H)     Osteopenia    Hypertension     Past Surgical History:   Procedure Laterality Date    BIOPSY      breast    BREAST SURGERY      right lumpectomy    COLONOSCOPY      PHACOEMULSIFICATION CLEAR CORNEA WITH DELUXE INTRAOCULAR LENS IMPLANT Left 12/30/2015    Procedure: PHACOEMULSIFICATION CLEAR CORNEA WITH DELUXE INTRAOCULAR LENS IMPLANT;  Surgeon: Wallace Tim MD;  Location: [facility]    PHACOEMULSIFICATION CLEAR CORNEA WITH DELUXE INTRAOCULAR LENS IMPLANT Right 1/6/2016    Procedure: PHACOEMULSIFICATION CLEAR CORNEA WITH DELUXE INTRAOCULAR LENS IMPLANT;  Surgeon: Wallace Tim MD;  Location: [facility]    refractive surgery      TONSILLECTOMY & ADENOIDECTOMY      TUBAL LIGATION         Current Medications include:   Current Outpatient Medications   Medication Sig Dispense Refill    albuterol (PROAIR HFA/PROVENTIL HFA/VENTOLIN HFA) 108 (90 Base) MCG/ACT inhaler Inhale 1-2 puffs into the lungs      amLODIPine (NORVASC) 2.5 MG tablet Take 1 tablet (2.5 mg) by mouth At Bedtime 90 tablet 3    ASPIRIN ADULT LOW STRENGTH PO Take 81 mg by mouth daily      dorzolamide (TRUSOPT) 2 % ophthalmic solution Apply 1 drop to eye      evolocumab (REPATHA SURECLICK) 140 MG/ML prefilled autoinjector Inject 140 mg subcutaneously every 14 days      hydrochlorothiazide (HYDRODIURIL) 25 MG tablet Take 0.5 tablets (12.5 mg) by mouth      latanoprost (XALATAN) 0.005 % ophthalmic solution       losartan (COZAAR) 50 MG tablet TAKE 2 TABLETS BY MOUTH EVERY  tablet 1    VITAMIN D, CHOLECALCIFEROL, PO Take 2,000 Units by mouth daily      letrozole (FEMARA) 2.5 MG tablet Take 1 tablet by mouth daily (Patient not taking: Reported on 7/24/2024)      omeprazole (PRILOSEC) 20 MG DR capsule TAKE 1 CAPSULE BY MOUTH EVERY DAY (Patient not taking: Reported on 7/24/2024) 90 capsule 1     Allergies   Allergen Reactions    Penicillins Shortness Of Breath       Health Maintenance   Topic Date Due    DEXA  Never done    ADVANCE CARE PLANNING  Never done     HEPATITIS C SCREENING  Never done    RSV VACCINE (Pregnancy & 60+) (1 - 1-dose 60+ series) Never done    MAMMO SCREENING  12/30/2020    MEDICARE ANNUAL WELLNESS VISIT  08/17/2023    DTAP/TDAP/TD IMMUNIZATION (2 - Td or Tdap) 11/12/2023    COVID-19 Vaccine (6 - 2023-24 season) 11/27/2023    ZOSTER IMMUNIZATION (2 of 2) 08/02/2023    INFLUENZA VACCINE (1) 09/01/2024    FALL RISK ASSESSMENT  07/24/2025    GLUCOSE  10/03/2026    LIPID  10/03/2028    PHQ-2 (once per calendar year)  Completed    Pneumococcal Vaccine: 65+ Years  Completed    IPV IMMUNIZATION  Aged Out    HPV IMMUNIZATION  Aged Out    MENINGITIS IMMUNIZATION  Aged Out    RSV MONOCLONAL ANTIBODY  Aged Out       Cancer screenings:  Breast: -  Under care of breast cancer specialist.   Cervical: - Had many normal. Not needed  Colorectal: - Last colonoscopy was two years ago and normal      Advance directive: Has done them      Social  Social History     Social History Narrative     to Terry López. They have 7 children between the two of them (3 girls born to [facility] and 3 boys were [facility]     Originally from [place], [place].     Has lived in [place] for decades.     Retired. Was H.R. 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     Spends half the year in [place].     Enjoys: Gardening, walking and spending time with friends.            Cognition: No concerns. 5/5 on minicog.         7/23/2024   General Health   How would you rate your overall physical health? Excellent   Feel stress (tense, anxious, or unable to sleep) To some extent      (!) STRESS CONCERN      7/23/2024   Nutrition   Diet: Vegetarian/vegan            7/23/2024   Exercise   Days per week of moderate/strenous exercise 3 days   Average minutes spent exercising at this level 40 min    Lots of walking and gardening        7/23/2024   Social Factors   Frequency of gathering with friends or relatives Twice a week   Worry food won't last until get money to buy more No   Food  not last or not have enough money for food? No   Do you have housing? (Housing is defined as stable permanent housing and does not include staying ouside in a car, in a tent, in an abandoned building, in an overnight shelter, or couch-surfing.) Yes   Are you worried about losing your housing? No   Lack of transportation? No   Unable to get utilities (heat,electricity)? No            7/23/2024   Fall Risk   Fallen 2 or more times in the past year? No   Trouble with walking or balance? No             7/23/2024   Activities of Daily Living- Home Safety   Needs help with the following daily activites None of the above   Safety concerns in the home No grab bars in the bathroom            7/23/2024   Dental   Dentist two times every year? Yes            7/23/2024   Hearing Screening   Hearing concerns? None of the above            7/23/2024   Driving Risk Screening   Patient/family members have concerns about driving No            7/23/2024   General Alertness/Fatigue Screening   Have you been more tired than usual lately? No            7/23/2024   Urinary Incontinence Screening   Bothered by leaking urine in past 6 months No            7/23/2024   TB Screening   Were you born outside of the [place]? No        Today's PHQ-2 Score:       7/23/2024     6:14 PM   PHQ-2 ( 1999 Pfizer)   Q1: Little interest or pleasure in doing things 0   Q2: Feeling down, depressed or hopeless 0   PHQ-2 Score 0   Q1: Little interest or pleasure in doing things Not at all   Q2: Feeling down, depressed or hopeless Not at all   PHQ-2 Score 0           7/23/2024   Substance Use   Alcohol more than 3/day or more than 7/wk No   Do you have a current opioid prescription? No   How severe/bad is pain from 1 to 10? 2/10   Do you use any other substances recreationally? No        Social History     Tobacco Use    Smoking status: Never    Smokeless tobacco: Never   Vaping Use    Vaping status: Never Used   Substance Use Topics    Alcohol use: Yes    Drug use:  "No     ROS  PHQ-2 Score:         7/23/2024     6:14 PM 5/22/2023     8:25 AM   PHQ-2 ( 1999 Pfizer)   Q1: Little interest or pleasure in doing things 0 0   Q2: Feeling down, depressed or hopeless 0 0   PHQ-2 Score 0 0   Q1: Little interest or pleasure in doing things Not at all    Q2: Feeling down, depressed or hopeless Not at all    PHQ-2 Score 0        Family History   Problem Relation Age of Onset    Diabetes Mother     Heart Failure Mother     Hypertension Father     Cerebrovascular Disease Father     Glaucoma Father     Diabetes Type 2  Brother     Cerebrovascular Disease Brother 72       Immunizations are as follows:      Immunization History   Administered Date(s) Administered    COVID-19 12+ (2023-24) (Pfizer) 10/02/2023    COVID-19 Bivalent 12+ (Pfizer) 09/16/2022, 06/07/2023    COVID-19 MONOVALENT 12+ (Pfizer) 01/13/2021, 10/06/2021    Influenza (IIV3) PF 09/29/2020    Influenza Vaccine 65+ (FLUAD) 09/08/2023    Influenza Vaccine 65+ (Fluzone HD) 08/26/2021, 09/16/2022    Influenza Vaccine >6 months,quad, PF 11/19/2014, 12/08/2015, 12/12/2016    Influenza, seasonal, injectable, PF 11/17/2010    Pneumo Conj 13-V (2010&after) 11/19/2014    Pneumococcal 23 valent 08/31/2012    TDAP (Adacel,Boostrix) 11/12/2013    Td (Adult), Adsorbed 01/01/2001, 01/01/2004    Zoster recombinant adjuvanted (SHINGRIX) 06/07/2023    Zoster vaccine, live 07/30/2012         EXAM  /71 (BP Location: Left arm, Patient Position: Sitting, Cuff Size: Adult Regular)   Pulse 60   Temp 98.2  F (36.8  C) (Skin)   Resp 16   Ht 1.725 m (5' 7.91\")   Wt 77.1 kg (170 lb)   SpO2 99%   BMI 25.91 kg/m        GENERAL APPEARANCE:  she appears well and in no distress.  HEENT:   Eyes are normal.  Tympanic membranes normal.  Neck is supple. No adenopathy, thyroid normal to palpation  RESP: Lungs clear to auscultation bilaterally.  Axillae: no palpable axillary masses or adenopathy  CV: regular rate and rhythm, normal S1 S2,  no murmur, no " carotid bruits.  her area of reported tenderness is right at the bottom of the xiphoid process and at the upper out aspect of the abdomen. The rest of the abdominal exam was normal Bowel sounds normal  : Deferred. Reported no concerns.   Rectal exam: deferred  MS: Gait -  normal. Extremities with generally normal range of motion.  SKIN: No suspicious lesions or rashes  NEURO: Normal strength and tone, sensory exam grossly normal  PSYCH: mentation and affect appear normal.   EXT: no peripheral edema     EKG done today shows normal sinus rhythm at 60 bpm.  T waves are slightly inverted in V1 through V3.  This is actually an improvement from her past EKGs.    SEE TOP OF NOTE FOR ASSESSMENT AND PLAN      Gorge Odom MD  Family Medicine and Sports Medicine  [facility] Department of Family Medicine and Community Health

## 2024-07-24 NOTE — NURSING NOTE
"Alessandra  79 year old    Chief Complaint   Patient presents with    Wellness Visit    Chest Pain     About once or twice a day, the patient will get a \"fleeting\" strain in her chest, between her breasts    Wheezing     Normal issue for pt, not bothersome, has inhalers but doesn't not use them regularly     Results     Discuss CT Calcium score from 05/2023            Blood pressure 114/71, pulse 60, temperature 98.2  F (36.8  C), temperature source Skin, resp. rate 16, height 1.725 m (5' 7.91\"), weight 77.1 kg (170 lb), SpO2 99%. Body mass index is 25.91 kg/m .  There is no problem list on file for this patient.             Wt Readings from Last 2 Encounters:   07/24/24 77.1 kg (170 lb)   10/03/23 78.7 kg (173 lb 8 oz)       BP Readings from Last 3 Encounters:   07/24/24 114/71   10/03/23 108/69   05/22/23 129/81                Current Outpatient Medications   Medication Sig Dispense Refill    albuterol (PROAIR HFA/PROVENTIL HFA/VENTOLIN HFA) 108 (90 Base) MCG/ACT inhaler Inhale 1-2 puffs into the lungs      amLODIPine (NORVASC) 2.5 MG tablet Take 1 tablet (2.5 mg) by mouth At Bedtime 90 tablet 3    ASPIRIN ADULT LOW STRENGTH PO Take 81 mg by mouth daily      dorzolamide (TRUSOPT) 2 % ophthalmic solution Apply 1 drop to eye      evolocumab (REPATHA SURECLICK) 140 MG/ML prefilled autoinjector Inject 140 mg subcutaneously every 14 days      hydrochlorothiazide (HYDRODIURIL) 25 MG tablet Take 0.5 tablets (12.5 mg) by mouth      latanoprost (XALATAN) 0.005 % ophthalmic solution       losartan (COZAAR) 50 MG tablet TAKE 2 TABLETS BY MOUTH EVERY  tablet 1    VITAMIN D, CHOLECALCIFEROL, PO Take 2,000 Units by mouth daily      letrozole (FEMARA) 2.5 MG tablet Take 1 tablet by mouth daily (Patient not taking: Reported on 7/24/2024)      omeprazole (PRILOSEC) 20 MG DR capsule TAKE 1 CAPSULE BY MOUTH EVERY DAY (Patient not taking: Reported on 7/24/2024) 90 capsule 1     No current facility-administered medications for " "this visit.              Social History     Tobacco Use    Smoking status: Never    Smokeless tobacco: Never   Vaping Use    Vaping status: Never Used   Substance Use Topics    Alcohol use: Yes     Alcohol/week: 4.0 standard drinks of alcohol     Types: 4 Standard drinks or equivalent per week    Drug use: No              Health Maintenance Due   Topic Date Due    DEXA  Never done    ADVANCE CARE PLANNING  Never done    HEPATITIS C SCREENING  Never done    RSV VACCINE (Pregnancy & 60+) (1 - 1-dose 60+ series) Never done    MAMMO SCREENING  12/30/2020    MEDICARE ANNUAL WELLNESS VISIT  08/17/2023    DTAP/TDAP/TD IMMUNIZATION (2 - Td or Tdap) 11/12/2023    COVID-19 Vaccine (6 - 2023-24 season) 11/27/2023    ZOSTER IMMUNIZATION (2 of 2) 08/02/2023            No results found for: \"PAP\"           July 24, 2024 8:58 AM    "

## 2024-07-24 NOTE — PATIENT INSTRUCTIONS
Annual Exam/Preventive Issues   -Check Lipids and BMP  -Needs TDAP. Will go to pharmacy for that.   - Gets mammograms through oncology  - Increase strength training  - Will send us a copy of Advance Directives    -Specific concerns:     1. Substernal chest pain that is likely due to Gastroesophageal reflux  - Discussed not eating before bedtime.   - Take short walks after meals to avoid sitting or lying  - OK to use Tums  - Can also use Over the counter, Omeprazole    2. Hypertension, well controlled    -Follow up: as needed    Gorge Odom MD  Family Medicine and Sports Medicine

## 2024-08-06 ENCOUNTER — OFFICE VISIT (OUTPATIENT)
Dept: FAMILY MEDICINE | Facility: CLINIC | Age: 79
End: 2024-08-06
Payer: COMMERCIAL

## 2024-08-06 VITALS
DIASTOLIC BLOOD PRESSURE: 71 MMHG | TEMPERATURE: 98 F | BODY MASS INDEX: 26.37 KG/M2 | SYSTOLIC BLOOD PRESSURE: 107 MMHG | RESPIRATION RATE: 16 BRPM | OXYGEN SATURATION: 98 % | WEIGHT: 173 LBS | HEART RATE: 72 BPM

## 2024-08-06 DIAGNOSIS — H60.591 OTHER NONINFECTIVE ACUTE OTITIS EXTERNA OF RIGHT EAR: Primary | ICD-10-CM

## 2024-08-06 RX ORDER — NEOMYCIN SULFATE, POLYMYXIN B SULFATE, HYDROCORTISONE 3.5; 10000; 1 MG/ML; [USP'U]/ML; MG/ML
3 SOLUTION/ DROPS AURICULAR (OTIC) 4 TIMES DAILY
Qty: 10 ML | Refills: 0 | Status: SHIPPED | OUTPATIENT
Start: 2024-08-06 | End: 2024-08-13

## 2024-08-06 NOTE — NURSING NOTE
Alessandra  79 year old    Chief Complaint   Patient presents with    Ear Injury     RIGHT ear ache - did a bilateral at-home ear cleaning a few days before her physical with Lei, her RIGHT ear started aching a few days after the visit. Tylenol has been helpful. Pain can radiate into the right-side of her face- eye, teeth, cheek. Not affecting her hearing. (See [employer] message from 7/26)            Blood pressure 107/71, pulse 72, temperature 98  F (36.7  C), temperature source Skin, resp. rate 16, weight 78.5 kg (173 lb), SpO2 98%. Body mass index is 26.37 kg/m .    Patient Active Problem List   Diagnosis    Hyperlipidemia LDL goal <100    Infiltrating ductal carcinoma of bilateral female breasts (H)    Osteopenia    Hypertension              Wt Readings from Last 2 Encounters:   08/06/24 78.5 kg (173 lb)   07/24/24 77.1 kg (170 lb)       BP Readings from Last 3 Encounters:   08/06/24 107/71   07/24/24 114/71   10/03/23 108/69                Current Outpatient Medications   Medication Sig Dispense Refill    albuterol (PROAIR HFA/PROVENTIL HFA/VENTOLIN HFA) 108 (90 Base) MCG/ACT inhaler Inhale 1-2 puffs into the lungs      amLODIPine (NORVASC) 2.5 MG tablet Take 1 tablet (2.5 mg) by mouth At Bedtime 90 tablet 3    ASPIRIN ADULT LOW STRENGTH PO Take 81 mg by mouth daily      dorzolamide (TRUSOPT) 2 % ophthalmic solution Apply 1 drop to eye      evolocumab (REPATHA SURECLICK) 140 MG/ML prefilled autoinjector Inject 140 mg subcutaneously every 14 days      hydrochlorothiazide (HYDRODIURIL) 25 MG tablet Take 0.5 tablets (12.5 mg) by mouth      latanoprost (XALATAN) 0.005 % ophthalmic solution       losartan (COZAAR) 50 MG tablet TAKE 2 TABLETS BY MOUTH EVERY  tablet 1    VITAMIN D, CHOLECALCIFEROL, PO Take 2,000 Units by mouth daily       No current facility-administered medications for this visit.              Social History     Tobacco Use    Smoking status: Never    Smokeless tobacco: Never   Vaping Use     "Vaping status: Never Used   Substance Use Topics    Alcohol use: Yes     Alcohol/week: 4.0 standard drinks of alcohol     Types: 4 Standard drinks or equivalent per week    Drug use: No              Health Maintenance Due   Topic Date Due    HEPATITIS C SCREENING  Never done    RSV VACCINE (Pregnancy & 60+) (1 - 1-dose 60+ series) Never done    ZOSTER IMMUNIZATION (2 of 2) 08/02/2023    DTAP/TDAP/TD IMMUNIZATION (2 - Td or Tdap) 11/12/2023    COVID-19 Vaccine (6 - 2023-24 season) 11/27/2023            No results found for: \"PAP\"           August 6, 2024 9:46 AM    "

## 2024-08-06 NOTE — PROGRESS NOTES
Assessment & Plan     Alessandra was seen today for ear injury.    Diagnoses and all orders for this visit:    Other noninfective acute otitis externa of right ear  -     neomycin-polymyxin-hydrocortisone (CORTISPORIN) 3.5-01686-2 otic solution; Place 3 drops into the right ear 4 times daily for 7 days      Exam and history appears most consistent with otitis externa, possibly traumatic due to irrigation, although possible comedone development in the ear canal which may be contributing to symptoms. Will treat with corticosporin drops x 7 days, follow-up if no improvement or worsening symptoms.      Subjective   Alessandra is a 79 year old, presenting for the following health issues:  Ear Injury (RIGHT ear ache - did a bilateral at-home ear cleaning a few days before her physical with Lei, her RIGHT ear started aching a few days after the visit. Tylenol has been helpful. Pain can radiate into the right-side of her face- eye, teeth, cheek. Not affecting her hearing. (See [employer] message from 7/26))    HPI     Was having no trouble with ears  Has  ahistory of recurrent wax accumulation  Usually puts drop in - flushed out her ears with water  Saw Dr. Odom - checked ear  2 days later right ear started hurting    Is not improving, is not getting worse  Pain along whole R side of face  Pain comes and goes  Worse with chewing, worse in the evening    + pain with pulling/tugging on ear  No change in hearing    5-6/10  Tylenol has been helpful - this can even keep the pain better for longer  Feels it along back teeth, down into jaw        Objective    /71 (BP Location: Left arm, Patient Position: Sitting, Cuff Size: Adult Regular)   Pulse 72   Temp 98  F (36.7  C) (Skin)   Resp 16   Wt 78.5 kg (173 lb)   SpO2 98%   BMI 26.37 kg/m    Body mass index is 26.37 kg/m .  Physical Exam   GENERAL: alert and no distress  EYES: Eyes grossly normal to inspection, PERRL and conjunctivae and sclerae normal  HENT: R ear  canal with scab/erythema present anteriorly, TM normal. L TM and ear canal ar normal. Nose and mouth without ulcers or lesions  NECK: no adenopathy, no asymmetry, masses, or scars  MS: no gross musculoskeletal defects noted, no edema  SKIN: no suspicious lesions or rashes  NEURO: Normal strength and tone, mentation intact and speech normal  PSYCH: mentation appears normal, affect normal/bright          Signed Electronically by: Ashley Quick MD

## 2024-08-12 ENCOUNTER — TELEPHONE (OUTPATIENT)
Dept: FAMILY MEDICINE | Facility: CLINIC | Age: 79
End: 2024-08-12

## 2024-08-12 NOTE — TELEPHONE ENCOUNTER
Symptoms (route to triage team)    Who is calling - Patient  What is the symptom/s being reported - continued ear pain  When did the symptom/s begin - 8/6  Additional comments/details - Requesting call back from Rasta  Same day office visit offered? No  Advised patient that RN will call back within 3 hours? Yes  Ok to leave a message on VM? Yes

## 2024-08-12 NOTE — TELEPHONE ENCOUNTER
Alessandra reports that her ear feels better when the drops are administered but that there is still some nagging pain when she reaches the time for the next dose administration. She states that she is not currently using Tylenol like she was when the pain originally started. Dr. Quick recommends follow-up visit to ensure treatment is working if pain continues. Asked Alessandra to call us back early Wednesday morning to report on how she is feeling - if she is still experiencing pain will schedule with Dr. Odom for follow-up.    Rasta VIZCARRA, RN  08/12/24 11:48 AM

## 2024-08-15 ENCOUNTER — OFFICE VISIT (OUTPATIENT)
Dept: FAMILY MEDICINE | Facility: CLINIC | Age: 79
End: 2024-08-15
Payer: COMMERCIAL

## 2024-08-15 VITALS
SYSTOLIC BLOOD PRESSURE: 130 MMHG | BODY MASS INDEX: 25.91 KG/M2 | DIASTOLIC BLOOD PRESSURE: 81 MMHG | TEMPERATURE: 97.2 F | HEIGHT: 68 IN | RESPIRATION RATE: 16 BRPM | WEIGHT: 171 LBS | OXYGEN SATURATION: 100 % | HEART RATE: 70 BPM

## 2024-08-15 DIAGNOSIS — H93.8X1 EAR CONGESTION, RIGHT: Primary | ICD-10-CM

## 2024-08-15 NOTE — NURSING NOTE
"79 year old  Chief Complaint   Patient presents with    Follow Up     RIGHT ear, still having pain. Said isn't as bad, has been doing the ear drops and they help but when they wear off it starts hurting again.        Blood pressure 130/81, pulse 70, temperature 97.2  F (36.2  C), temperature source Skin, resp. rate 16, height 1.725 m (5' 7.91\"), weight 77.6 kg (171 lb), SpO2 100%. Body mass index is 26.07 kg/m .  Patient Active Problem List   Diagnosis    Hyperlipidemia LDL goal <100    Infiltrating ductal carcinoma of bilateral female breasts (H)    Osteopenia    Hypertension       Wt Readings from Last 2 Encounters:   08/15/24 77.6 kg (171 lb)   08/06/24 78.5 kg (173 lb)     BP Readings from Last 3 Encounters:   08/15/24 130/81   08/06/24 107/71   07/24/24 114/71         Current Outpatient Medications   Medication Sig Dispense Refill    albuterol (PROAIR HFA/PROVENTIL HFA/VENTOLIN HFA) 108 (90 Base) MCG/ACT inhaler Inhale 1-2 puffs into the lungs      amLODIPine (NORVASC) 2.5 MG tablet Take 1 tablet (2.5 mg) by mouth At Bedtime 90 tablet 3    ASPIRIN ADULT LOW STRENGTH PO Take 81 mg by mouth daily      dorzolamide (TRUSOPT) 2 % ophthalmic solution Apply 1 drop to eye      evolocumab (REPATHA SURECLICK) 140 MG/ML prefilled autoinjector Inject 140 mg subcutaneously every 14 days      hydrochlorothiazide (HYDRODIURIL) 25 MG tablet Take 0.5 tablets (12.5 mg) by mouth      latanoprost (XALATAN) 0.005 % ophthalmic solution       losartan (COZAAR) 50 MG tablet TAKE 2 TABLETS BY MOUTH EVERY  tablet 1    VITAMIN D, CHOLECALCIFEROL, PO Take 2,000 Units by mouth daily       No current facility-administered medications for this visit.       Social History     Tobacco Use    Smoking status: Never    Smokeless tobacco: Never   Vaping Use    Vaping status: Never Used   Substance Use Topics    Alcohol use: Yes     Alcohol/week: 4.0 standard drinks of alcohol     Types: 4 Standard drinks or equivalent per week    Drug use: " "No       Health Maintenance Due   Topic Date Due    HEPATITIS C SCREENING  Never done    RSV VACCINE (Pregnancy & 60+) (1 - 1-dose 60+ series) Never done    ZOSTER IMMUNIZATION (2 of 2) 08/02/2023    DTAP/TDAP/TD IMMUNIZATION (2 - Td or Tdap) 11/12/2023    COVID-19 Vaccine (6 - 2023-24 season) 11/27/2023       No results found for: \"PAP\"      August 15, 2024 10:35 AM   "

## 2024-08-15 NOTE — PROGRESS NOTES
ASSESSMENT and PLAN:     RIGHT ear impaction with cerumen, improving but still with some pain   Continue to use your Corticosporin drops into the RIGHT ear until bottle is done  Also, add your home Debrox drops to help to remove the wax  Put in referral for ENT specialist clinic evaluation. If symptoms resolve, can decline referral.       Gorge Odom MD  Family Medicine and Sports Medicine  [facility]      Medical assistant intake:  Alessandra Nieves is a 79 year old female who presents to [facility] today for Follow Up (RIGHT ear, still having pain. Said isn't as bad, has been doing the ear drops and they help but when they wear off it starts hurting again. )      SUBJECTIVE:   Alessandra is   Here today due to persistent pain in her right ear.  She was diagnosed with otitis externa on August 6 which is 9 days ago.  In brief, she cleaned her ears prior to her visit with me in late July.  She did not have ear pain after that and had a normal ear exam during the visit with me but then developed ear pain a few days later which she believes is from the cleaning.   she saw Dr. Quick  Who prescribed Corticosporin.   Alessandra is doing a little bit better but still has a pain inside of her right ear.     Review Of Systems:   no fever or sweats or chills.  No symptoms of systemic illness.  Problem list per EMR:  Patient Active Problem List   Diagnosis    Hyperlipidemia LDL goal <100    Infiltrating ductal carcinoma of bilateral female breasts (H)    Osteopenia    Hypertension       Current Outpatient Medications   Medication Sig Dispense Refill    albuterol (PROAIR HFA/PROVENTIL HFA/VENTOLIN HFA) 108 (90 Base) MCG/ACT inhaler Inhale 1-2 puffs into the lungs      amLODIPine (NORVASC) 2.5 MG tablet Take 1 tablet (2.5 mg) by mouth At Bedtime 90 tablet 3    ASPIRIN ADULT LOW STRENGTH PO Take 81 mg by mouth daily      dorzolamide (TRUSOPT) 2 % ophthalmic solution Apply 1 drop to eye      evolocumab (REPATHA  "SURECLICK) 140 MG/ML prefilled autoinjector Inject 140 mg subcutaneously every 14 days      hydrochlorothiazide (HYDRODIURIL) 25 MG tablet Take 0.5 tablets (12.5 mg) by mouth      latanoprost (XALATAN) 0.005 % ophthalmic solution       losartan (COZAAR) 50 MG tablet TAKE 2 TABLETS BY MOUTH EVERY  tablet 1    VITAMIN D, CHOLECALCIFEROL, PO Take 2,000 Units by mouth daily         Allergies   Allergen Reactions    Penicillins Shortness Of Breath        OBJECTIVE    Vitals: /81 (BP Location: Left arm, Patient Position: Sitting, Cuff Size: Adult Regular)   Pulse 70   Temp 97.2  F (36.2  C) (Skin)   Resp 16   Ht 1.725 m (5' 7.91\")   Wt 77.6 kg (171 lb)   SpO2 100%   BMI 26.07 kg/m    BMI= Body mass index is 26.07 kg/m .    She appears in good health.  Her right tympanic membrane is partially obscured by cerumen and with even visualization of the cerumen with the otoscope tip would cause some pain against the ear dry.  She had no pain with tugging on the ear globe itself.  She had no lymphadenopathy.    Her left tympanic membrane appear normal.    SEE TOP OF NOTE FOR ASSESSMENT AND PLAN    --Gorge Odom MD  [facility] [place], Department of Family Medicine and Community Health  "

## 2024-08-15 NOTE — PATIENT INSTRUCTIONS
ASSESSMENT and PLAN:     RIGHT ear impaction with cerumen, persistant with some pain   Continue to use your Corticosporin drops into the RIGHT ear until bottle is done  Also, add your home Debrox drops to help to remove the wax  Put in referral for ENT specialist clinic evaluation. If symptoms resolve, can decline referral.       Gorge Odmo MD  Family Medicine and Sports Medicine  [facility]

## 2024-10-17 DIAGNOSIS — I10 HYPERTENSION, UNSPECIFIED TYPE: ICD-10-CM

## 2024-10-17 RX ORDER — LOSARTAN POTASSIUM 50 MG/1
100 TABLET ORAL DAILY
Qty: 200 TABLET | Refills: 1 | Status: SHIPPED | OUTPATIENT
Start: 2024-10-17

## 2024-10-17 NOTE — TELEPHONE ENCOUNTER
Patient has [facility] coverage and is eligible to get certain prescriptions as a 100-day supply.      Prescriptions updated to 100-day supply: losartan     Tresa Trevino PharmD, [facility]  Retail Pharmacy Specialist  [phone]

## 2024-10-20 DIAGNOSIS — I10 HYPERTENSION, UNSPECIFIED TYPE: ICD-10-CM

## 2024-10-22 RX ORDER — AMLODIPINE BESYLATE 2.5 MG/1
2.5 TABLET ORAL AT BEDTIME
Qty: 90 TABLET | Refills: 2 | Status: SHIPPED | OUTPATIENT
Start: 2024-10-22

## 2024-10-22 NOTE — TELEPHONE ENCOUNTER
Medication requested: amLODIPine (NORVASC) 2.5 MG tablet   Last office visit: 8/15/24  [facility] appointments: none  Medication last refilled: 10/3/23; 90 + 3 refills  Last qualifying labs:   BP Readings from Last 3 Encounters:   08/15/24 130/81   08/06/24 107/71   07/24/24 114/71     Component      Latest Ref Rng 7/24/2024  12:04 PM   GFR Estimate      >60 mL/min/1.73m2 68      Prescription approved per [facility] Refill Protocol.    Rasta VIZCARRA, RN  10/22/24 9:57 AM

## 2025-07-05 ENCOUNTER — HEALTH MAINTENANCE LETTER (OUTPATIENT)
Age: 80
End: 2025-07-05

## 2025-07-17 DIAGNOSIS — I10 HYPERTENSION, UNSPECIFIED TYPE: ICD-10-CM

## 2025-07-17 RX ORDER — AMLODIPINE BESYLATE 2.5 MG/1
2.5 TABLET ORAL AT BEDTIME
Qty: 90 TABLET | Refills: 0 | Status: SHIPPED | OUTPATIENT
Start: 2025-07-17

## 2025-07-17 NOTE — TELEPHONE ENCOUNTER
Medication requested: amLODIPine (NORVASC) 2.5 MG tablet   Last office visit: 8/15/24  [facility] appointments: none  Medication last refilled: 10/22/24; 90 + 2 refills  Last qualifying labs:   BP Readings from Last 3 Encounters:   08/15/24 130/81   08/06/24 107/71   07/24/24 114/71     Component      Latest Ref Rng 7/24/2024  12:04 PM   GFR Estimate      >60 mL/min/1.73m2 68      Prescription approved per [facility] Refill Protocol.    Rasta VIZCARRA, RN  07/17/25 11:26 AM

## (undated) RX ORDER — IVABRADINE 5 MG/1
TABLET, FILM COATED ORAL
Status: DISPENSED
Start: 2022-12-01

## (undated) RX ORDER — NITROGLYCERIN 0.4 MG/1
TABLET SUBLINGUAL
Status: DISPENSED
Start: 2022-12-01

## (undated) RX ORDER — METOPROLOL TARTRATE 50 MG
TABLET ORAL
Status: DISPENSED
Start: 2022-12-01